# Patient Record
Sex: MALE | Race: OTHER | NOT HISPANIC OR LATINO | Employment: OTHER | URBAN - METROPOLITAN AREA
[De-identification: names, ages, dates, MRNs, and addresses within clinical notes are randomized per-mention and may not be internally consistent; named-entity substitution may affect disease eponyms.]

---

## 2019-11-14 LAB
A1C: 9 (ref 0–5.7)
CHOL/HDLC RATIO: 5.8 (ref 0–5)
CHOLEST SERPL-MSCNC: 208 MG/DL (ref 0–200)
HDLC SERPL-MCNC: 36 MG/DL (ref 35–70)
LDL/HDL RATIO: ABNORMAL
LDLC SERPL CALC-MCNC: ABNORMAL MG/DL
NON HDL CHOL (CALC): 172 (ref 0–130)
TRIGL SERPL-MCNC: 437 MG/DL (ref 40–160)

## 2019-11-21 ENCOUNTER — OFFICE VISIT (OUTPATIENT)
Dept: FAMILY MEDICINE | Facility: CLINIC | Age: 74
End: 2019-11-21

## 2019-11-21 ENCOUNTER — PATIENT OUTREACH (OUTPATIENT)
Dept: ADMINISTRATIVE | Facility: HOSPITAL | Age: 74
End: 2019-11-21

## 2019-11-21 VITALS
BODY MASS INDEX: 28.7 KG/M2 | TEMPERATURE: 99 F | HEART RATE: 93 BPM | DIASTOLIC BLOOD PRESSURE: 88 MMHG | WEIGHT: 178.56 LBS | HEIGHT: 66 IN | SYSTOLIC BLOOD PRESSURE: 138 MMHG | OXYGEN SATURATION: 98 %

## 2019-11-21 DIAGNOSIS — Z23 NEED FOR VACCINATION AGAINST STREPTOCOCCUS PNEUMONIAE: ICD-10-CM

## 2019-11-21 DIAGNOSIS — I10 ESSENTIAL HYPERTENSION: ICD-10-CM

## 2019-11-21 DIAGNOSIS — Z23 NEED FOR INFLUENZA VACCINATION: ICD-10-CM

## 2019-11-21 DIAGNOSIS — Z76.89 ENCOUNTER TO ESTABLISH CARE WITH NEW DOCTOR: ICD-10-CM

## 2019-11-21 DIAGNOSIS — E11.65 UNCONTROLLED TYPE 2 DIABETES MELLITUS WITH HYPERGLYCEMIA: ICD-10-CM

## 2019-11-21 DIAGNOSIS — I25.10 CORONARY ARTERY DISEASE INVOLVING NATIVE CORONARY ARTERY OF NATIVE HEART WITHOUT ANGINA PECTORIS: ICD-10-CM

## 2019-11-21 DIAGNOSIS — E11.65 TYPE 2 DIABETES MELLITUS WITH HYPERGLYCEMIA, WITHOUT LONG-TERM CURRENT USE OF INSULIN: ICD-10-CM

## 2019-11-21 DIAGNOSIS — Z00.00 VISIT FOR WELL MAN HEALTH CHECK: Primary | ICD-10-CM

## 2019-11-21 DIAGNOSIS — Z11.59 NEED FOR HEPATITIS C SCREENING TEST: ICD-10-CM

## 2019-11-21 DIAGNOSIS — E78.49 OTHER HYPERLIPIDEMIA: ICD-10-CM

## 2019-11-21 DIAGNOSIS — K21.9 GASTROESOPHAGEAL REFLUX DISEASE, ESOPHAGITIS PRESENCE NOT SPECIFIED: ICD-10-CM

## 2019-11-21 DIAGNOSIS — N18.30 CKD (CHRONIC KIDNEY DISEASE) STAGE 3, GFR 30-59 ML/MIN: ICD-10-CM

## 2019-11-21 DIAGNOSIS — Z12.11 COLON CANCER SCREENING: ICD-10-CM

## 2019-11-21 DIAGNOSIS — Z59.89 DOES NOT HAVE HEALTH INSURANCE: ICD-10-CM

## 2019-11-21 PROCEDURE — 99999 PR PBB SHADOW E&M-NEW PATIENT-LVL V: ICD-10-PCS | Mod: PBBFAC,,, | Performed by: FAMILY MEDICINE

## 2019-11-21 PROCEDURE — 99205 OFFICE O/P NEW HI 60 MIN: CPT | Mod: PBBFAC,PO,25 | Performed by: FAMILY MEDICINE

## 2019-11-21 PROCEDURE — 99999 PR PBB SHADOW E&M-NEW PATIENT-LVL V: CPT | Mod: PBBFAC,,, | Performed by: FAMILY MEDICINE

## 2019-11-21 PROCEDURE — 99387 PR PREVENTIVE VISIT,NEW,65 & OVER: ICD-10-PCS | Mod: S$PBB,,, | Performed by: FAMILY MEDICINE

## 2019-11-21 PROCEDURE — 99387 INIT PM E/M NEW PAT 65+ YRS: CPT | Mod: S$PBB,,, | Performed by: FAMILY MEDICINE

## 2019-11-21 PROCEDURE — 90662 IIV NO PRSV INCREASED AG IM: CPT | Mod: PBBFAC,PO

## 2019-11-21 PROCEDURE — 90472 IMMUNIZATION ADMIN EACH ADD: CPT | Mod: PBBFAC,PO

## 2019-11-21 RX ORDER — AMLODIPINE BESYLATE 10 MG/1
10 TABLET ORAL DAILY
Qty: 90 TABLET | Refills: 1 | Status: SHIPPED | OUTPATIENT
Start: 2019-11-21 | End: 2020-01-02 | Stop reason: SDUPTHER

## 2019-11-21 RX ORDER — BISOPROLOL FUMARATE 5 MG/1
5 TABLET, FILM COATED ORAL DAILY
Qty: 90 TABLET | Refills: 0 | Status: SHIPPED | OUTPATIENT
Start: 2019-11-21 | End: 2020-01-02 | Stop reason: SDUPTHER

## 2019-11-21 RX ORDER — GLIMEPIRIDE 4 MG/1
4 TABLET ORAL
Qty: 90 TABLET | Refills: 0 | Status: SHIPPED | OUTPATIENT
Start: 2019-11-21 | End: 2020-01-02

## 2019-11-21 RX ORDER — NAPROXEN SODIUM 220 MG/1
81 TABLET, FILM COATED ORAL DAILY
Start: 2019-11-21 | End: 2021-02-17 | Stop reason: SDUPTHER

## 2019-11-21 RX ORDER — METFORMIN HYDROCHLORIDE 1000 MG/1
1000 TABLET ORAL 2 TIMES DAILY WITH MEALS
Qty: 180 TABLET | Refills: 0 | Status: SHIPPED | OUTPATIENT
Start: 2019-11-21 | End: 2020-01-02 | Stop reason: SDUPTHER

## 2019-11-21 RX ORDER — VALSARTAN 160 MG/1
160 TABLET ORAL DAILY
Qty: 90 TABLET | Refills: 0 | Status: SHIPPED | OUTPATIENT
Start: 2019-11-21 | End: 2020-01-02 | Stop reason: SDUPTHER

## 2019-11-21 RX ORDER — OMEPRAZOLE 40 MG/1
40 CAPSULE, DELAYED RELEASE ORAL DAILY
Qty: 90 CAPSULE | Refills: 0 | Status: SHIPPED | OUTPATIENT
Start: 2019-11-21 | End: 2020-01-02 | Stop reason: SDUPTHER

## 2019-11-21 RX ORDER — AMLODIPINE BESYLATE 10 MG/1
10 TABLET ORAL DAILY
COMMUNITY
End: 2019-11-21 | Stop reason: SDUPTHER

## 2019-11-21 RX ORDER — OMEPRAZOLE 40 MG/1
40 CAPSULE, DELAYED RELEASE ORAL DAILY
COMMUNITY
End: 2019-11-21 | Stop reason: SDUPTHER

## 2019-11-21 RX ORDER — GLIMEPIRIDE 4 MG/1
4 TABLET ORAL 2 TIMES DAILY
Refills: 3 | COMMUNITY
Start: 2019-10-16 | End: 2019-11-21 | Stop reason: SDUPTHER

## 2019-11-21 RX ORDER — ATORVASTATIN CALCIUM 40 MG/1
80 TABLET, FILM COATED ORAL DAILY
Qty: 180 TABLET | Refills: 3 | Status: SHIPPED | OUTPATIENT
Start: 2019-11-21 | End: 2020-01-02 | Stop reason: SDUPTHER

## 2019-11-21 RX ORDER — METFORMIN HYDROCHLORIDE 1000 MG/1
1000 TABLET ORAL 2 TIMES DAILY
Refills: 3 | COMMUNITY
Start: 2019-10-16 | End: 2019-11-21 | Stop reason: SDUPTHER

## 2019-11-21 SDOH — SOCIAL DETERMINANTS OF HEALTH (SDOH): OTHER PROBLEMS RELATED TO HOUSING AND ECONOMIC CIRCUMSTANCES: Z59.89

## 2019-11-21 NOTE — PROGRESS NOTES
Subjective:       Patient ID: Joann Adrian is a 73 y.o. male.    Chief Complaint: Establish Care    Joann Adrian is a 73 y.o. male who presents today to [facility].     He has DM, HTN, and DLD along with CAD. He is not currently taking a statin.     Reviewed medication regimen. Made changes.     He had shingles, getting topamax for this from Dr. Eagle, pain management.     He is getting insurance in January.     No family history of thyroid cancer. No other history of endocrine tumor noted in the family that patient is aware of. The only family history of cancer that patient is aware of is: none    Diet/ Exercise: he can't eat much due to his teeth. He has bread and octavio in the day. He eats 3 meals/day. He walks about 5 blocks.     Labs: ordered and reviewed.     LDL unable to be calculated  Triglycerides >400  A1c: 9.0  CKD 3 noted, creatine 1.73. GFT: 39    C-scope: FIT Kit      PMHx: reviewed in EMR and updated  Meds: reviewed in EMR and updated  Shx: reviewed in EMR and updated  FMHx: no family history of colon cancer, breast cancer, ovarian cancer  Social: lives at home with his son (patient of mine), his wife, DIL, and 4 grandchildren. No pets at home.       Review of Systems   Constitutional: Positive for appetite change. Negative for chills and fever.   Respiratory: Negative for cough, chest tightness and shortness of breath.    Cardiovascular: Negative for chest pain and leg swelling.   Gastrointestinal: Negative for constipation, diarrhea, nausea and vomiting.   Genitourinary: Negative for difficulty urinating.   Neurological: Negative for dizziness, light-headedness and headaches.         Health Maintenance Due   Topic Date Due    Hepatitis C Screening  1945    Lipid Panel  1945    Hemoglobin A1c  1945    Fecal Occult Blood Test (FOBT)/FitKit  1945    Eye Exam  11/22/1956    TETANUS VACCINE  11/22/1964    Pneumococcal Vaccine (65+ High/Highest Risk) (1 of 2 -  PCV13) 11/22/2011     Immunization History   Administered Date(s) Administered    Influenza - High Dose - PF (65 years and older) 11/21/2019    Pneumococcal Polysaccharide - 23 Valent 11/21/2019         Objective:     Vitals:    11/21/19 1004   BP: 138/88   Pulse: 93   Temp: 98.6 °F (37 °C)        Physical Exam   Constitutional: He is oriented to person, place, and time. He appears well-developed and well-nourished.   HENT:   Head: Normocephalic and atraumatic.   Eyes: Conjunctivae are normal.   Neck: Normal range of motion. Neck supple.   Cardiovascular: Normal rate and regular rhythm.   Midline sternal scar noted   Pulmonary/Chest: Effort normal and breath sounds normal.   Abdominal: Soft. There is no tenderness.   Musculoskeletal: He exhibits no edema.   Feet:   Right Foot:   Protective Sensation: 10 sites tested. 10 sites sensed.   Skin Integrity: Positive for dry skin. Negative for callus.   Left Foot:   Protective Sensation: 10 sites tested. 10 sites sensed.   Skin Integrity: Positive for dry skin. Negative for callus.   Neurological: He is alert and oriented to person, place, and time.   Skin: Skin is warm. No rash noted.   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 His behavior is normal. Judgment and thought content normal.   Nursing note and vitals reviewed.      Assessment:       1. Visit for well man health check    2. Encounter to establish care with new doctor    3. Type 2 diabetes mellitus with hyperglycemia, without long-term current use of insulin    4. Uncontrolled type 2 diabetes mellitus with hyperglycemia    5. Essential hypertension    6. Other hyperlipidemia    7. Does not have health insurance    8. Coronary artery disease involving native coronary artery of native heart without angina pectoris    9. Gastroesophageal reflux disease, esophagitis presence not specified    10. BMI 28.0-28.9,adult    11. CKD (chronic kidney disease) stage 3,  GFR 30-59 ml/min    12. Need for influenza vaccination    13. Need for vaccination against Streptococcus pneumoniae    14. Need for hepatitis C screening test    15. Colon cancer screening        Plan:       Start atorvastatin 80 mg for cholesterol and CAD  Continue bisoprolol 5 mg for your heart/HTN  Continue amlodipine 10 mg for HTN  Start valsartan 160 mg for HTN  Continue metformin 1000 mg Twice daily  Continue amaryl but ONLY once daily  Start once weekly trulicity - monitor for any issues such as swallowing or neck swelling. Can get one month supply x 1 free from [facility]. Discussed case with pharmacist there.       Advised frequent self blood glucose monitoring.  Patient encouraged to document glucose results and bring them to every clinic visit. Will send me a log on patient portal in 2 weeks.  Hypoglycemia precautions discussed. Instructed on precautions before driving.    Close adherence to lifestyle changes recommended    No NSAIDS such as ibuprofen or naproxen. Avoid Red meats. Drink a lot of fluids. Continue monitoring CMP q3-6 months      60 minutes spent with patient, of which >50% was spent on counseling and coordination of care.       Visit for [facility] health check  -     Cancel: Diabetic Eye Screening Photo; Future  -     Microalbumin/creatinine urine ratio; Standing  -     Ambulatory referral to Outpatient Case Management  -     Ambulatory referral to Social Work  -     CBC auto differential; Future; Expected date: 11/21/2019  -     Comprehensive metabolic panel; Future; Expected date: 11/21/2019  -     Hemoglobin A1c; Future; Expected date: 11/21/2019  -     Lipid panel; Future; Expected date: 11/21/2019  -     Ambulatory Referral to Pharmacy Assistance  -     Ambulatory referral to Optometry    Encounter to establish care with new doctor  -     Ambulatory referral to Outpatient Case Management  -     Ambulatory referral to Social Work  -     Ambulatory Referral to Pharmacy  Assistance    Type 2 diabetes mellitus with hyperglycemia, without long-term current use of insulin  -     Cancel: Diabetic Eye Screening Photo; Future  -     Microalbumin/creatinine urine ratio; Standing  -     Ambulatory referral to Outpatient Case Management  -     Ambulatory referral to Social Work  -     Ambulatory Referral to Pharmacy Assistance  -     dulaglutide (TRULICITY) 1.5 mg/0.5 mL PnIj; Inject 1 Dose into the skin once a week.  Dispense: 2 mL; Refill: 0  -     metFORMIN (GLUCOPHAGE) 1000 MG tablet; Take 1 tablet (1,000 mg total) by mouth 2 (two) times daily with meals.  Dispense: 180 tablet; Refill: 0  -     glimepiride (AMARYL) 4 MG tablet; Take 1 tablet (4 mg total) by mouth daily with breakfast.  Dispense: 90 tablet; Refill: 0  -     Ambulatory Referral to Medical Fitness ([facility])  -     [facility] ASSIGN QUESTIONNAIRE SERIES ([facility])  -     [facility] ([facility])  -     Ambulatory referral to Optometry    Uncontrolled type 2 diabetes mellitus with hyperglycemia  -     Ambulatory referral to Outpatient Case Management  -     Ambulatory referral to Social Work  -     Hemoglobin A1c; Future; Expected date: 11/21/2019  -     Ambulatory Referral to Pharmacy Assistance  -     Ambulatory referral to Optometry    Essential hypertension  -     Ambulatory referral to Outpatient Case Management  -     Ambulatory referral to Social Work  -     CBC auto differential; Future; Expected date: 11/21/2019  -     Comprehensive metabolic panel; Future; Expected date: 11/21/2019  -     Ambulatory Referral to Pharmacy Assistance  -     bisoprolol (ZEBETA) 5 MG tablet; Take 1 tablet (5 mg total) by mouth once daily.  Dispense: 90 tablet; Refill: 0  -     amLODIPine (NORVASC) 10 MG tablet; Take 1 tablet (10 mg total) by mouth once daily.  Dispense: 90 tablet; Refill: 1  -     valsartan (DIOVAN) 160 MG tablet; Take 1 tablet (160 mg total) by mouth once daily.  Dispense: 90 tablet; Refill:  0    Other hyperlipidemia  -     Ambulatory referral to Outpatient Case Management  -     Ambulatory referral to Social Work  -     Lipid panel; Future; Expected date: 11/21/2019  -     Ambulatory Referral to Pharmacy Assistance  -     atorvastatin (LIPITOR) 80 MG tablet; Take 1 tablet (80 mg total) by mouth once daily.  Dispense: 90 tablet; Refill: 3    Does not have health insurance  -     Ambulatory referral to Outpatient Case Management  -     Ambulatory referral to Social Work  -     Ambulatory Referral to Pharmacy Assistance  -     Ambulatory Referral to Medical Fitness ([facility])  -     [facility] ASSIGN QUESTIONNAIRE SERIES ([facility])  -     [facility] Patient Entered [facility] ([facility])    Coronary artery disease involving native coronary artery of native heart without angina pectoris  -     bisoprolol (ZEBETA) 5 MG tablet; Take 1 tablet (5 mg total) by mouth once daily.  Dispense: 90 tablet; Refill: 0  -     aspirin 81 MG Chew; Take 1 tablet (81 mg total) by mouth once daily.  -     Echo Color Flow Doppler? Yes; Future  -     Ambulatory referral to Cardiology    Gastroesophageal reflux disease, esophagitis presence not specified  -     omeprazole (PRILOSEC) 40 MG capsule; Take 1 capsule (40 mg total) by mouth once daily.  Dispense: 90 capsule; Refill: 0    BMI 28.0-28.9,adult  -     Ambulatory Referral to Medical Fitness ([facility])  -     [facility] ASSIGN QUESTIONNAIRE SERIES ([facility])  -     [facility] ([facility])    CKD (chronic kidney disease) stage 3, GFR 30-59 ml/min  -     Vitamin D; Future; Expected date: 11/21/2019  -     Cancel: Urinalysis  -     Cancel: Urine culture  -     Urine culture; Future; Expected date: 11/21/2019  -     Urinalysis; Future; Expected date: 11/21/2019    Need for influenza vaccination  -     Influenza - High Dose (65+) (PF) (IM)    Need for vaccination against Streptococcus pneumoniae  -     (In Office Administered) Pneumococcal Polysaccharide  Vaccine (23 Valent) (SQ/IM)    Need for hepatitis C screening test  -     Hepatitis C antibody; Future; Expected date: 11/21/2019    Colon cancer screening  -     Fecal Immunochemical Test (iFOBT); Future; Expected date: 11/21/2019

## 2019-11-21 NOTE — Clinical Note
Please add on Hep C and urine to f/u labs in 3 months.Shanna, please schedule cardiology/ECHO after 1/1/20. If there is an external optometry that takes uninsured patients, please have him set up for that. If not available, schedule optometry after 1/1/20 within [facility].

## 2019-11-21 NOTE — PATIENT INSTRUCTIONS
Start atorvastatin 80 mg for cholesterol and CAD  Continue bisoprolol 5 mg for your heart/HTN  Continue amlodipine 10 mg for HTN  Start valsartan 160 mg for HTN  Continue metformin 1000 mg Twice daily  Continue amaryl but ONLY once daily  Start once weekly trulicity - monitor for any issues such as swallowing or neck swelling      Advised frequent self blood glucose monitoring.  Patient encouraged to document glucose results and bring them to every clinic visit. Will send me a log on patient portal in 2 weeks.  Hypoglycemia precautions discussed. Instructed on precautions before driving.    Close adherence to lifestyle changes recommended    No NSAIDS such as ibuprofen or naproxen. Avoid Red meats. Drink a lot of fluids. Continue monitoring CMP q3-6 months

## 2019-11-22 ENCOUNTER — LAB VISIT (OUTPATIENT)
Dept: LAB | Facility: HOSPITAL | Age: 74
End: 2019-11-22
Attending: FAMILY MEDICINE

## 2019-11-22 DIAGNOSIS — Z12.11 COLON CANCER SCREENING: ICD-10-CM

## 2019-11-22 PROCEDURE — 82274 ASSAY TEST FOR BLOOD FECAL: CPT

## 2019-11-23 ENCOUNTER — PATIENT MESSAGE (OUTPATIENT)
Dept: FAMILY MEDICINE | Facility: CLINIC | Age: 74
End: 2019-11-23

## 2019-11-23 ENCOUNTER — PATIENT MESSAGE (OUTPATIENT)
Dept: ADMINISTRATIVE | Facility: OTHER | Age: 74
End: 2019-11-23

## 2019-12-02 LAB — HEMOCCULT STL QL IA: NEGATIVE

## 2019-12-06 ENCOUNTER — OUTPATIENT CASE MANAGEMENT (OUTPATIENT)
Dept: ADMINISTRATIVE | Facility: OTHER | Age: 74
End: 2019-12-06

## 2019-12-06 DIAGNOSIS — E11.9 TYPE 2 DIABETES MELLITUS WITHOUT COMPLICATION, UNSPECIFIED WHETHER LONG TERM INSULIN USE: ICD-10-CM

## 2019-12-06 NOTE — PROGRESS NOTES
Outpatient Care Management   - Low Risk Patient Assessment    Patient: Joann Adrian  MRN:  [id number]  Date of Service:  12/6/2019  Completed by:  Leia Bergeron LCSW  Referral Date: 11/21/2019  Program: OPCM Low Risk    Reason for Visit   Patient presents with    OPCM Chart Review    Social Work Assessment - Low/Mod Risk    Plan Of Care    Case Closure       Patient Summary     OPCM Social Work Assessment (Low/Moderate Risk)    General  Level of Caregiver support:  Member independent and does not need caregiver assistance  Have you had to make a decision between paying for any of the following in the last 2 months?:  None  Transportation means:  Family  Employment status:  Retired and not working  Do you have any of the following?:  Caregiver make informed decisions on your behalf  Current symptoms:  Restlessness  Assessments  Was the PHQ Depression Screening completed this visit?:  No  Was the RAVINDER-7 Screening completed this visit?:  No         Complex Care Plan     Care plan was discussed and completed today with input from patient and/or caregiver.    Goals      Patient/caregiver will have knowledge of resources available in order to obtain the services that are needed prior to the end of this encounter.      Overall Time to Completion  1 week from 12/06/2019     Social Work Identified Patient Barriers:         Short Term Goals  Patient/caregiver will verbalize knowledge of available resources prior to the end of this encounter.   Interventions:  · Complete community search for available resources via [facility].  · Discuss and provide community resources by [employer] Mail, telephonically, and referral made to PAP: Dental resources ([facility] [employer] [facility], [facility]), contact number for [facility], Advance Directives  Status:  · Met              Patient Instructions     No follow-ups on file.    Todays OPCM Self-Management Care  Plan was developed with the patients/caregivers input and was based on identified barriers from todays assessment.  Goals were written today with the patient/caregiver and the patient has agreed to work towards these goals to improve his/her overall well-being. Patient verbalized understanding of the care plan, goals, and all of today's instructions. Encouraged patient/caregiver to communicate with his/her physician and health care team about health conditions and the treatment plan.  Provided my contact information today and encouraged patient/caregiver to call me with any questions as needed.

## 2019-12-06 NOTE — PROGRESS NOTES
This LCSW received a referral on the above patient.    Reason for referral: consultation, no insurance  Pt gave permission to complete assessment with his son.  Per Jory with [facility], pt became a legal [place] citizen 4 months ago; per Medicaid eligibility requirements: must be a legal resident for 5 years.  Per conversation with son, pt did not pay into the Medicare system therefore may not be eligible for Medicare.  Son has contact number for the social security office.  Son states pt is signed up with [facility] effective Jan 1, 2020.  Encouraged son to have insurance card scanned into chart at next MD appointment  Name of community resource that was provided: Inbox message to PAP, [facility] [phone], dental resources ([facility], [facility]), Advance Directives.    Resource given to: son

## 2019-12-21 DIAGNOSIS — E11.65 TYPE 2 DIABETES MELLITUS WITH HYPERGLYCEMIA, WITHOUT LONG-TERM CURRENT USE OF INSULIN: ICD-10-CM

## 2019-12-31 ENCOUNTER — DOCUMENTATION ONLY (OUTPATIENT)
Dept: PHARMACY | Facility: CLINIC | Age: 74
End: 2019-12-31

## 2019-12-31 NOTE — PROGRESS NOTES
Patient Picked up Amlodipine, Atorvastatin, Bisoprolol, Glimepride,Metformin, Omperazole, Valsartan free of charge. He is enrolled in our [employer] Pricing Program through 02/21/20.  Any additional medication fills must be approved by PAP Technician via cost transfer form. Restrictions apply.

## 2020-01-02 ENCOUNTER — OFFICE VISIT (OUTPATIENT)
Dept: FAMILY MEDICINE | Facility: CLINIC | Age: 75
End: 2020-01-02
Payer: COMMERCIAL

## 2020-01-02 ENCOUNTER — TELEPHONE (OUTPATIENT)
Dept: PHARMACY | Facility: CLINIC | Age: 75
End: 2020-01-02

## 2020-01-02 VITALS
TEMPERATURE: 98 F | HEART RATE: 96 BPM | DIASTOLIC BLOOD PRESSURE: 78 MMHG | HEIGHT: 66 IN | BODY MASS INDEX: 28.91 KG/M2 | SYSTOLIC BLOOD PRESSURE: 130 MMHG | WEIGHT: 179.88 LBS | OXYGEN SATURATION: 96 %

## 2020-01-02 DIAGNOSIS — R10.10 PAIN OF UPPER ABDOMEN: ICD-10-CM

## 2020-01-02 DIAGNOSIS — I10 ESSENTIAL HYPERTENSION: ICD-10-CM

## 2020-01-02 DIAGNOSIS — E11.65 TYPE 2 DIABETES MELLITUS WITH HYPERGLYCEMIA, WITHOUT LONG-TERM CURRENT USE OF INSULIN: Primary | ICD-10-CM

## 2020-01-02 DIAGNOSIS — R35.0 URINARY FREQUENCY: ICD-10-CM

## 2020-01-02 DIAGNOSIS — N18.30 CKD (CHRONIC KIDNEY DISEASE) STAGE 3, GFR 30-59 ML/MIN: ICD-10-CM

## 2020-01-02 DIAGNOSIS — E11.65 UNCONTROLLED TYPE 2 DIABETES MELLITUS WITH HYPERGLYCEMIA: ICD-10-CM

## 2020-01-02 DIAGNOSIS — K21.9 GASTROESOPHAGEAL REFLUX DISEASE, ESOPHAGITIS PRESENCE NOT SPECIFIED: ICD-10-CM

## 2020-01-02 DIAGNOSIS — E78.49 OTHER HYPERLIPIDEMIA: ICD-10-CM

## 2020-01-02 DIAGNOSIS — I25.10 CORONARY ARTERY DISEASE INVOLVING NATIVE CORONARY ARTERY OF NATIVE HEART WITHOUT ANGINA PECTORIS: ICD-10-CM

## 2020-01-02 DIAGNOSIS — K59.09 CHRONIC CONSTIPATION: ICD-10-CM

## 2020-01-02 DIAGNOSIS — M54.31 SCIATICA OF RIGHT SIDE: ICD-10-CM

## 2020-01-02 LAB
BACTERIA #/AREA URNS AUTO: ABNORMAL /HPF
BILIRUB UR QL STRIP: NEGATIVE
CLARITY UR REFRACT.AUTO: CLEAR
COLOR UR AUTO: YELLOW
GLUCOSE UR QL STRIP: NEGATIVE
HGB UR QL STRIP: ABNORMAL
HYALINE CASTS UR QL AUTO: 3 /LPF
KETONES UR QL STRIP: NEGATIVE
LEUKOCYTE ESTERASE UR QL STRIP: NEGATIVE
MICROSCOPIC COMMENT: ABNORMAL
NITRITE UR QL STRIP: NEGATIVE
PH UR STRIP: 5 [PH] (ref 5–8)
PROT UR QL STRIP: ABNORMAL
RBC #/AREA URNS AUTO: 9 /HPF (ref 0–4)
SP GR UR STRIP: 1.02 (ref 1–1.03)
SQUAMOUS #/AREA URNS AUTO: 0 /HPF
URN SPEC COLLECT METH UR: ABNORMAL
WBC #/AREA URNS AUTO: 0 /HPF (ref 0–5)

## 2020-01-02 PROCEDURE — 99214 PR OFFICE/OUTPT VISIT, EST, LEVL IV, 30-39 MIN: ICD-10-PCS | Mod: S$GLB,,, | Performed by: FAMILY MEDICINE

## 2020-01-02 PROCEDURE — 99999 PR PBB SHADOW E&M-EST. PATIENT-LVL IV: CPT | Mod: PBBFAC,,, | Performed by: FAMILY MEDICINE

## 2020-01-02 PROCEDURE — 3075F PR MOST RECENT SYSTOLIC BLOOD PRESS GE 130-139MM HG: ICD-10-PCS | Mod: CPTII,S$GLB,, | Performed by: FAMILY MEDICINE

## 2020-01-02 PROCEDURE — 1159F PR MEDICATION LIST DOCUMENTED IN MEDICAL RECORD: ICD-10-PCS | Mod: S$GLB,,, | Performed by: FAMILY MEDICINE

## 2020-01-02 PROCEDURE — 87088 URINE BACTERIA CULTURE: CPT

## 2020-01-02 PROCEDURE — 87077 CULTURE AEROBIC IDENTIFY: CPT

## 2020-01-02 PROCEDURE — 1126F PR PAIN SEVERITY QUANTIFIED, NO PAIN PRESENT: ICD-10-PCS | Mod: S$GLB,,, | Performed by: FAMILY MEDICINE

## 2020-01-02 PROCEDURE — 3078F DIAST BP <80 MM HG: CPT | Mod: CPTII,S$GLB,, | Performed by: FAMILY MEDICINE

## 2020-01-02 PROCEDURE — 1101F PR PT FALLS ASSESS DOC 0-1 FALLS W/OUT INJ PAST YR: ICD-10-PCS | Mod: CPTII,S$GLB,, | Performed by: FAMILY MEDICINE

## 2020-01-02 PROCEDURE — 1126F AMNT PAIN NOTED NONE PRSNT: CPT | Mod: S$GLB,,, | Performed by: FAMILY MEDICINE

## 2020-01-02 PROCEDURE — 87086 URINE CULTURE/COLONY COUNT: CPT

## 2020-01-02 PROCEDURE — 3078F PR MOST RECENT DIASTOLIC BLOOD PRESSURE < 80 MM HG: ICD-10-PCS | Mod: CPTII,S$GLB,, | Performed by: FAMILY MEDICINE

## 2020-01-02 PROCEDURE — 81001 URINALYSIS AUTO W/SCOPE: CPT

## 2020-01-02 PROCEDURE — 1101F PT FALLS ASSESS-DOCD LE1/YR: CPT | Mod: CPTII,S$GLB,, | Performed by: FAMILY MEDICINE

## 2020-01-02 PROCEDURE — 99999 PR PBB SHADOW E&M-EST. PATIENT-LVL IV: ICD-10-PCS | Mod: PBBFAC,,, | Performed by: FAMILY MEDICINE

## 2020-01-02 PROCEDURE — 99214 OFFICE O/P EST MOD 30 MIN: CPT | Mod: S$GLB,,, | Performed by: FAMILY MEDICINE

## 2020-01-02 PROCEDURE — 87186 SC STD MICRODIL/AGAR DIL: CPT

## 2020-01-02 PROCEDURE — 1159F MED LIST DOCD IN RCRD: CPT | Mod: S$GLB,,, | Performed by: FAMILY MEDICINE

## 2020-01-02 PROCEDURE — 3075F SYST BP GE 130 - 139MM HG: CPT | Mod: CPTII,S$GLB,, | Performed by: FAMILY MEDICINE

## 2020-01-02 RX ORDER — BISOPROLOL FUMARATE 5 MG/1
5 TABLET, FILM COATED ORAL DAILY
Qty: 90 TABLET | Refills: 0 | Status: SHIPPED | OUTPATIENT
Start: 2020-01-02 | End: 2020-05-20 | Stop reason: SDUPTHER

## 2020-01-02 RX ORDER — VALSARTAN 160 MG/1
160 TABLET ORAL DAILY
Qty: 90 TABLET | Refills: 0 | Status: SHIPPED | OUTPATIENT
Start: 2020-01-02 | End: 2020-06-16 | Stop reason: SDUPTHER

## 2020-01-02 RX ORDER — OMEPRAZOLE 40 MG/1
40 CAPSULE, DELAYED RELEASE ORAL DAILY
Qty: 90 CAPSULE | Refills: 0 | Status: SHIPPED | OUTPATIENT
Start: 2020-01-02 | End: 2020-05-20 | Stop reason: SDUPTHER

## 2020-01-02 RX ORDER — GLIMEPIRIDE 1 MG/1
1 TABLET ORAL
Qty: 90 TABLET | Refills: 0 | Status: SHIPPED | OUTPATIENT
Start: 2020-01-02 | End: 2020-01-22

## 2020-01-02 RX ORDER — AMLODIPINE BESYLATE 10 MG/1
10 TABLET ORAL DAILY
Qty: 90 TABLET | Refills: 1 | Status: SHIPPED | OUTPATIENT
Start: 2020-01-02 | End: 2020-05-20 | Stop reason: SDUPTHER

## 2020-01-02 RX ORDER — GABAPENTIN 300 MG/1
300 CAPSULE ORAL NIGHTLY
Qty: 90 CAPSULE | Refills: 0 | Status: SHIPPED | OUTPATIENT
Start: 2020-01-02 | End: 2020-03-10

## 2020-01-02 RX ORDER — ATORVASTATIN CALCIUM 40 MG/1
40 TABLET, FILM COATED ORAL DAILY
Qty: 90 TABLET | Refills: 3 | Status: SHIPPED | OUTPATIENT
Start: 2020-01-02 | End: 2020-05-20 | Stop reason: SDUPTHER

## 2020-01-02 RX ORDER — METFORMIN HYDROCHLORIDE 1000 MG/1
1000 TABLET ORAL 2 TIMES DAILY WITH MEALS
Qty: 180 TABLET | Refills: 0 | Status: SHIPPED | OUTPATIENT
Start: 2020-01-02 | End: 2020-05-20 | Stop reason: SDUPTHER

## 2020-01-02 NOTE — PROGRESS NOTES
Subjective:       Patient ID: Joann Adrian is a 74 y.o. male.    Chief Complaint: Follow-up (6 wk dm)    LOW is a 74 y.o. male who presents today for follow up on his DM, HTN, DLD, and CAD    DM: on trulicity and metformin. amaryl 4 mg caused low blood sugar. Appetite is not good. He has gained weight.   GI: Has constipation. FIT test was negative. No pain with eating. This has been ongoing for months. He has intermittent abdominal pain. No triggers. .   DLD: Started atorvastatin 80 mg for cholesterol and CAD. 80 mg possible caused GI issues. Decreased to once weekly. This improved GI symptoms.    HTN: on bisoprolol 5 mg, amlodipine 10 mg, valsartan 160 mg for HTN  CAD: not taking statin consistently.     He has been having pain from his hip down to his right foot. This has been ongoing for about a year.     Review of Systems   Constitutional: Positive for appetite change. Negative for chills and fever.   Respiratory: Negative for cough, chest tightness and shortness of breath.    Cardiovascular: Negative for chest pain and leg swelling.   Gastrointestinal: Negative for constipation, diarrhea, nausea and vomiting.   Genitourinary: Negative for difficulty urinating.   Neurological: Negative for dizziness, light-headedness and headaches.         Results for orders placed or performed in visit on 11/22/19   Fecal Immunochemical Test (iFOBT)   Result Value Ref Range    Fecal Immunochemical Test (iFOBT) Negative Negative       Objective:     Vitals:    01/02/20 1039   BP: 130/78   Pulse: 96   Temp: 98.2 °F (36.8 °C)        Physical Exam   Constitutional: He is oriented to person, place, and time. He appears well-developed and well-nourished.   HENT:   Head: Normocephalic and atraumatic.   Eyes: Conjunctivae are normal.   Neck: Normal range of motion. Neck supple.   Cardiovascular: Normal rate and regular rhythm.   Midline sternal scar noted   Pulmonary/Chest: Effort normal and breath sounds normal.   Abdominal: Soft.  There is tenderness in the right upper quadrant.   Musculoskeletal: He exhibits tenderness. He exhibits no edema.   TTP of the piriformis (right)   Feet:   Left Foot:   Skin Integrity: Negative for callus.   Neurological: He is alert and oriented to person, place, and time.   Skin: Skin is warm.   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 His behavior is normal. Judgment and thought content normal.   Nursing note and vitals reviewed.      Assessment:       1. Type 2 diabetes mellitus with hyperglycemia, without long-term current use of insulin    2. Uncontrolled type 2 diabetes mellitus with hyperglycemia    3. CKD (chronic kidney disease) stage 3, GFR 30-59 ml/min    4. Essential hypertension    5. Other hyperlipidemia    6. BMI 29.0-29.9,adult    7. Gastroesophageal reflux disease, esophagitis presence not specified    8. Coronary artery disease involving native coronary artery of native heart without angina pectoris    9. Sciatica of right side    10. Chronic constipation    11. Pain of upper abdomen    12. Urinary frequency        Plan:       continue atorvastatin but at 40 mg for cholesterol and CAD  Continue bisoprolol 5 mg for your heart/HTN  Continue amlodipine 10 mg for HTN  Continue valsartan 160 mg for HTN  Continue metformin 1000 mg Twice daily  Restart amaryl but only at 1 MG daily  Continue once weekly trulicity - monitor for any issues such as swallowing or neck swelling. Can get one month supply x 1 free from [facility]. Discussed case with pharmacist there.     Start gabapentin and PT for suspected sciatic pain. No imaging at this time.     Having urinary frequency; check urine today. 2/2 to DM vs BPH? Will try to ensure diabetes is well controlled. F/u at next visit.      Advised frequent self blood glucose monitoring.  Patient encouraged to document glucose results and bring them to every clinic visit. Will send me a log on patient portal  in 2 weeks. Reviewed BS log today:   Fasting sugars between 110-170.     Close adherence to lifestyle changes recommended     No NSAIDS such as ibuprofen or naproxen. Avoid Red meats. Drink a lot of fluids. Continue monitoring CMP q3-6 months    Labs in 6 weeks. F/u after.     Type 2 diabetes mellitus with hyperglycemia, without long-term current use of insulin  -     dulaglutide (TRULICITY) 1.5 mg/0.5 mL PnIj; Inject 1 syringe into the skin once a week.  Dispense: 6 mL; Refill: 0  -     metFORMIN (GLUCOPHAGE) 1000 MG tablet; Take 1 tablet (1,000 mg total) by mouth 2 (two) times daily with meals.  Dispense: 180 tablet; Refill: 0  -     glimepiride (AMARYL) 1 MG tablet; Take 1 tablet (1 mg total) by mouth before breakfast.  Dispense: 90 tablet; Refill: 0    Uncontrolled type 2 diabetes mellitus with hyperglycemia  -     dulaglutide (TRULICITY) 1.5 mg/0.5 mL PnIj; Inject 1 syringe into the skin once a week.  Dispense: 6 mL; Refill: 0  -     metFORMIN (GLUCOPHAGE) 1000 MG tablet; Take 1 tablet (1,000 mg total) by mouth 2 (two) times daily with meals.  Dispense: 180 tablet; Refill: 0  -     glimepiride (AMARYL) 1 MG tablet; Take 1 tablet (1 mg total) by mouth before breakfast.  Dispense: 90 tablet; Refill: 0    CKD (chronic kidney disease) stage 3, GFR 30-59 ml/min    Essential hypertension  -     valsartan (DIOVAN) 160 MG tablet; Take 1 tablet (160 mg total) by mouth once daily.  Dispense: 90 tablet; Refill: 0  -     bisoprolol (ZEBETA) 5 MG tablet; Take 1 tablet (5 mg total) by mouth once daily.  Dispense: 90 tablet; Refill: 0  -     amLODIPine (NORVASC) 10 MG tablet; Take 1 tablet (10 mg total) by mouth once daily.  Dispense: 90 tablet; Refill: 1    Other hyperlipidemia  -     atorvastatin (LIPITOR) 40 MG tablet; Take 1 tablet (40 mg total) by mouth once daily.  Dispense: 90 tablet; Refill: 3    BMI 29.0-29.9,adult    Gastroesophageal reflux disease, esophagitis presence not specified  -     omeprazole (PRILOSEC) 40  MG capsule; Take 1 capsule (40 mg total) by mouth once daily.  Dispense: 90 capsule; Refill: 0    Coronary artery disease involving native coronary artery of native heart without angina pectoris  -     bisoprolol (ZEBETA) 5 MG tablet; Take 1 tablet (5 mg total) by mouth once daily.  Dispense: 90 tablet; Refill: 0    Sciatica of right side  -     gabapentin (NEURONTIN) 300 MG capsule; Take 1 capsule (300 mg total) by mouth every evening.  Dispense: 90 capsule; Refill: 0  -     Ambulatory Referral to Physical/Occupational Therapy    Chronic constipation  -     CT Abdomen Pelvis With Contrast; Future; Expected date: 01/02/2020  -     Creatinine, serum; Future; Expected date: 01/02/2020    Pain of upper abdomen  -     CT Abdomen Pelvis With Contrast; Future; Expected date: 01/02/2020  -     Creatinine, serum; Future; Expected date: 01/02/2020    Urinary frequency  -     Urinalysis  -     Urine culture        Warning signs discussed, patient to call with any further issues or worsening of symptoms.

## 2020-01-02 NOTE — PATIENT INSTRUCTIONS
continue atorvastatin but at 40 mg for cholesterol and CAD  Continue bisoprolol 5 mg for your heart/HTN  Continue amlodipine 10 mg for HTN  Start valsartan 160 mg for HTN  Continue metformin 1000 mg Twice daily  Continue amaryl but only at 1 MG daily  Continue once weekly trulicity - monitor for any issues such as swallowing or neck swelling. Can get one month supply x 1 free from [facility]. Discussed case with pharmacist there.     Start gabapentin nightly and PT for sciatic pain.      Advised frequent self blood glucose monitoring.  Patient encouraged to document glucose results and bring them to every clinic visit. Will send me a log on patient portal in 2 weeks.  Close adherence to lifestyle changes recommended     No NSAIDS such as ibuprofen or naproxen. Avoid Red meats. Drink a lot of fluids. Continue monitoring CMP q3-6 months

## 2020-01-05 ENCOUNTER — PATIENT MESSAGE (OUTPATIENT)
Dept: FAMILY MEDICINE | Facility: CLINIC | Age: 75
End: 2020-01-05

## 2020-01-05 DIAGNOSIS — N39.0 ENTEROCOCCUS UTI: Primary | ICD-10-CM

## 2020-01-05 DIAGNOSIS — B95.2 ENTEROCOCCUS UTI: Primary | ICD-10-CM

## 2020-01-05 LAB — BACTERIA UR CULT: ABNORMAL

## 2020-01-05 RX ORDER — AMOXICILLIN 500 MG/1
500 TABLET, FILM COATED ORAL 3 TIMES DAILY
Qty: 15 TABLET | Refills: 0 | Status: SHIPPED | OUTPATIENT
Start: 2020-01-05 | End: 2020-01-10

## 2020-01-08 ENCOUNTER — LAB VISIT (OUTPATIENT)
Dept: LAB | Facility: HOSPITAL | Age: 75
End: 2020-01-08
Attending: FAMILY MEDICINE
Payer: COMMERCIAL

## 2020-01-08 DIAGNOSIS — K59.09 CHRONIC CONSTIPATION: ICD-10-CM

## 2020-01-08 DIAGNOSIS — R10.10 PAIN OF UPPER ABDOMEN: ICD-10-CM

## 2020-01-08 LAB
CREAT SERPL-MCNC: 1.6 MG/DL (ref 0.5–1.4)
EST. GFR  (AFRICAN AMERICAN): 49 ML/MIN/1.73 M^2
EST. GFR  (NON AFRICAN AMERICAN): 42 ML/MIN/1.73 M^2

## 2020-01-08 PROCEDURE — 82565 ASSAY OF CREATININE: CPT

## 2020-01-08 PROCEDURE — 36415 COLL VENOUS BLD VENIPUNCTURE: CPT

## 2020-01-09 ENCOUNTER — HOSPITAL ENCOUNTER (OUTPATIENT)
Dept: RADIOLOGY | Facility: HOSPITAL | Age: 75
Discharge: HOME | End: 2020-01-09
Attending: FAMILY MEDICINE
Payer: COMMERCIAL

## 2020-01-09 DIAGNOSIS — R10.10 PAIN OF UPPER ABDOMEN: ICD-10-CM

## 2020-01-09 DIAGNOSIS — K80.20 CALCULUS OF GALLBLADDER WITHOUT CHOLECYSTITIS WITHOUT OBSTRUCTION: ICD-10-CM

## 2020-01-09 DIAGNOSIS — R91.1 INCIDENTAL LUNG NODULE, GREATER THAN OR EQUAL TO 8MM: Primary | ICD-10-CM

## 2020-01-09 DIAGNOSIS — R59.9 ENLARGED LYMPH NODES: ICD-10-CM

## 2020-01-09 DIAGNOSIS — K59.09 CHRONIC CONSTIPATION: ICD-10-CM

## 2020-01-09 PROCEDURE — 74177 CT ABD & PELVIS W/CONTRAST: CPT | Mod: TC

## 2020-01-09 PROCEDURE — 74177 CT ABDOMEN PELVIS WITH CONTRAST: ICD-10-PCS | Mod: 26,,, | Performed by: RADIOLOGY

## 2020-01-09 PROCEDURE — 74177 CT ABD & PELVIS W/CONTRAST: CPT | Mod: 26,,, | Performed by: RADIOLOGY

## 2020-01-09 PROCEDURE — 25500020 PHARM REV CODE 255: Performed by: FAMILY MEDICINE

## 2020-01-09 RX ADMIN — IOHEXOL 1000 ML: 9 SOLUTION ORAL at 11:01

## 2020-01-09 RX ADMIN — IOHEXOL 75 ML: 350 INJECTION, SOLUTION INTRAVENOUS at 12:01

## 2020-01-15 ENCOUNTER — CLINICAL SUPPORT (OUTPATIENT)
Dept: REHABILITATION | Facility: HOSPITAL | Age: 75
End: 2020-01-15
Payer: COMMERCIAL

## 2020-01-15 DIAGNOSIS — M54.41 CHRONIC RIGHT-SIDED LOW BACK PAIN WITH RIGHT-SIDED SCIATICA: ICD-10-CM

## 2020-01-15 DIAGNOSIS — M25.551 PAIN OF RIGHT HIP JOINT: ICD-10-CM

## 2020-01-15 DIAGNOSIS — G89.29 CHRONIC RIGHT-SIDED LOW BACK PAIN WITH RIGHT-SIDED SCIATICA: ICD-10-CM

## 2020-01-15 PROCEDURE — 97110 THERAPEUTIC EXERCISES: CPT | Mod: PN | Performed by: PHYSICAL THERAPIST

## 2020-01-15 PROCEDURE — 97162 PT EVAL MOD COMPLEX 30 MIN: CPT | Mod: PN | Performed by: PHYSICAL THERAPIST

## 2020-01-15 NOTE — PATIENT INSTRUCTIONS
Strengthening: Hip Abductor - Resisted    Lie flat with band looped around both legs above knees, and push thighs apart, ensuring right and left move together.  Repeat 10 times per set. Do 3 sets per session. Do 1 sessions per day.      Clam Shells    Lie on side with knees bent. Raise top leg, keeping knee bent and ankles together. Do not let your hips roll back as your raise your leg.  Repeat 12 times each leg per set. Do 2 sets per session. Do 1 sessions per day.       Piriformis Stretch - Supine        Pull uninvolved knee across body toward opposite shoulder. Hold slight stretch for 20 seconds. Repeat with involved leg.  Repeat 4 times. Do 1-2 times per day.    Copyright © I. All rights reserved.

## 2020-01-15 NOTE — PLAN OF CARE
[facility]  Physical Therapy Initial Evaluation    Name: Joann Adrian  Clinic Number: [id number]    Therapy Diagnosis:   Encounter Diagnoses   Name Primary?    Chronic right-sided low back pain with right-sided sciatica     Pain of right hip joint      Physician: Karel Matthew DO    Physician Orders: PT Eval and Treat   Medical Diagnosis from Referral: M54.31 (ICD-10-CM) - Sciatica of right side  Evaluation Date: 1/15/2020  Authorization Period Expiration: 12/31/2020  Plan of Care Expiration: 3/11/2020  Visit # / Visits authorized: 1/ 50  FOTO: 1/10      Time In: 1225  Time Out: 100  Total Billable Time: 35 minutes    Precautions: Standard, Diabetes and CAD    Subjective   Date of onset: 6-7 months   *Patient's son translating during appointment.     History of current condition - KD reports: posterior and lateral hip pain that has progressively gotten worse over previous few months, that radiates down right lower extremity into ankle/foot. Pt presents pain at all times, just fluctuates with activity. Pt is able to still walk but has antalgic gait along RLE due to pain and weakness. Pt does not have low back pain as much, mainly starts in hip prior to radiation.      Medical History:   Past Medical History:   Diagnosis Date    Coronary artery disease     Diabetes mellitus, type 2     Hyperlipidemia     Hypertension        Surgical History:   Joann Adrian  has a past surgical history that includes Coronary artery bypass graft and Cataract extraction, bilateral.    Medications:   Joann has a current medication list which includes the following prescription(s): amlodipine, aspirin, atorvastatin, bisoprolol, dulaglutide, gabapentin, glimepiride, OPW TEST CLAIM - DO NOT FILL, metformin, omeprazole, and valsartan.    Allergies:   Review of patient's allergies indicates:  No Known Allergies     Imaging, None    Prior Therapy: None  Social History:  lives with their  family  Occupation: Retired  Prior Level of Function: able to complete all ADL's without discomfort  Current Level of Function: unable to complete ADL's as well as transfers without radiating pains.     Pain:  Current 4/10, worst 9/10, best 3/10   Location: right hip, right lower extremity   Description: Aching, Dull, Burning, Tingling, Numb, Sharp and Shooting  Aggravating Factors: Standing, Walking, Lifting and Getting out of bed/chair  Easing Factors: nothing    Pts goals: to decrease radiating pain, improve walking and transitions    Objective     Posture: No significant deficits noted  Palpation: Minimal tenderness noted along right lateral hip, posterior hip  Sensation: No deficits noted on evaluation    Range of Motion/Strength:     Thoracolumbar AROM Pain/Dysfunction with Movement   Flexion 70    Extension 15 Discomfort in back   Right side bending 20    Left side bending 20        Hip Right Left Pain/Dysfunction with Movement   AROM/PROM      flexion WNL WNL    extension Limited Limited    abduction WNL WNL    Internal rotation Limited Limited Painful in hip   External rotation WNL WNL Stiffness in end range bilaterally     Knee Right Left Pain/Dysfunction with Movement   AROM/PROM WNL WNL        L/E MMT Right Left Pain/Dysfunction with Movement   Hip Flexion 4/5 4/5    Hip Extension 4+/5 4+/5    Hip Abduction 4/5 4/5    Hip Adduction 5/5 5/5    Knee Flexion 5/5 5/5    Knee Extension 4+/5 4+/5    Ankle DF 5/5 5/5    Ankle PF 5/5 5/5        Special Tests: SLR (-) bilaterally. Hamstring tightness noted. Piriformis test (+) on right side.     Gait Analysis: Antalgic gait noted, minimal trendelenberg along right side      CMS Impairment/Limitation/Restriction for FOTO Lumbar spine Survey    Therapist reviewed FOTO scores for Joann Adrian on 1/15/2020.   FOTO documents entered into [employer] - see Media section.    Limitation Score: 52%         TREATMENT   Treatment Time In: 1245  Treatment Time Out: 100  Total  "Treatment time separate from Evaluation: 15 minutes    KD received therapeutic exercises to develop strength and flexibility for 15 minutes including:  Supine hip abduction with strap 5" hold, 30x  Clamshells 2x12 bilateral  Piriformis stretch 20" hold, 4x bilateral  HEP demonstration    Home Exercises Provided and Patient Education Provided     Education provided:   - HEP provided  - Home modalities prn    Written Home Exercises Provided: yes.  Exercises were reviewed and KD was able to demonstrate them prior to the end of the session.  KD demonstrated good  understanding of the education provided.     See EMR under Patient Instructions for exercises provided 1/15/2020.      Assessment   Joann is a 74 y.o. male referred to outpatient Physical Therapy with a medical diagnosis of sciatica of right side. Pt presents with radiating pains down RLE during activity and at rest. Pt educated on home modalities as well as given HEP to further improve pain. Pt will benefit from hip soft tissue management and strengthening program.    Pt prognosis is Good.   Pt will benefit from skilled outpatient Physical Therapy to address the deficits stated above and in the chart below, provide pt/family education, and to maximize pt's level of independence.     Plan of care discussed with patient: Yes  Pt's spiritual, cultural and educational needs considered and patient is agreeable to the plan of care and goals as stated below:     Anticipated Barriers for therapy: Language barrier, age    Medical Necessity is demonstrated by the following  History  Co-morbidities and personal factors that may impact the plan of care Co-morbidities:   advanced age and diabetes    Personal Factors:   age  social background     high   Examination  Body Structures and Functions, activity limitations and participation restrictions that may impact the plan of care Body Regions:   back    Body Systems:    gross symmetry  ROM  strength  gross coordinated " movement  gait  transfers    Participation Restrictions:   Walking  lifting    Activity limitations:   Learning and applying knowledge  no deficits    General Tasks and Commands  undertaking a single task    Communication  no deficits    Mobility  lifting and carrying objects  walking    Self care  no deficits    Domestic Life  doing house work (cleaning house, washing dishes, laundry)    Interactions/Relationships  no deficits    Life Areas  no deficits    Community and Social Life  community life  recreation and leisure         high   Clinical Presentation evolving clinical presentation with changing clinical characteristics moderate   Decision Making/ Complexity Score: moderate       Goals:  Long Term Goals (8 Weeks):   1. Pt will be compliant with HEP to supplement PT in decreasing pain with functional mobility.  2. Pt will improve impaired LE MMTs to >/=5/5 to improve strength for functional tasks.  3. Pt will improve FOTO score to </= 40% limited to decrease perceived limitation with maintaining/changing body position.   4. Pt will report no pain during lumbar ROM to promote functional mobility.      Plan   Plan of care Certification: 1/15/2020 to 3/11/2020.    Outpatient Physical Therapy 1 times weekly for 8 weeks to include the following interventions: Gait Training, Manual Therapy, Moist Heat/ Ice, Neuromuscular Re-ed, Patient Education, Self Care, Therapeutic Activites and Therapeutic Exercise, ASTYM, Kinesiotaping PRN, Functional Dry Needling    Carlos Dougherty, PT, DPT

## 2020-01-22 ENCOUNTER — DOCUMENTATION ONLY (OUTPATIENT)
Dept: REHABILITATION | Facility: HOSPITAL | Age: 75
End: 2020-01-22

## 2020-01-22 ENCOUNTER — TELEPHONE (OUTPATIENT)
Dept: FAMILY MEDICINE | Facility: CLINIC | Age: 75
End: 2020-01-22

## 2020-01-22 ENCOUNTER — PATIENT MESSAGE (OUTPATIENT)
Dept: FAMILY MEDICINE | Facility: CLINIC | Age: 75
End: 2020-01-22

## 2020-01-22 ENCOUNTER — CLINICAL SUPPORT (OUTPATIENT)
Dept: REHABILITATION | Facility: HOSPITAL | Age: 75
End: 2020-01-22
Payer: COMMERCIAL

## 2020-01-22 DIAGNOSIS — M25.551 PAIN OF RIGHT HIP JOINT: ICD-10-CM

## 2020-01-22 DIAGNOSIS — E11.65 TYPE 2 DIABETES MELLITUS WITH HYPERGLYCEMIA, WITHOUT LONG-TERM CURRENT USE OF INSULIN: Primary | ICD-10-CM

## 2020-01-22 DIAGNOSIS — M54.41 CHRONIC RIGHT-SIDED LOW BACK PAIN WITH RIGHT-SIDED SCIATICA: ICD-10-CM

## 2020-01-22 DIAGNOSIS — G89.29 CHRONIC RIGHT-SIDED LOW BACK PAIN WITH RIGHT-SIDED SCIATICA: ICD-10-CM

## 2020-01-22 PROCEDURE — 97110 THERAPEUTIC EXERCISES: CPT | Mod: PN

## 2020-01-22 RX ORDER — GLIMEPIRIDE 4 MG/1
4 TABLET ORAL
Qty: 90 TABLET | Refills: 0 | Status: SHIPPED | OUTPATIENT
Start: 2020-01-22 | End: 2020-05-20 | Stop reason: SDUPTHER

## 2020-01-22 NOTE — PROGRESS NOTES
PT/PTA met face to face to discuss pt's treatment plan and progress towards established goals. Pt will be seen by a physical therapist minimally every 6th visit or every 30 days.    Please see Updated Plan of Care for changes and updated goals.    Carlos Dougherty, PT     Devyn Lebron, PTA    Jackson Michael, PTA

## 2020-01-22 NOTE — PROGRESS NOTES
"  Physical Therapy Daily Treatment Note     Name: Joann Adrian  Clinic Number: [id number]    Therapy Diagnosis:   Encounter Diagnoses   Name Primary?    Chronic right-sided low back pain with right-sided sciatica     Pain of right hip joint      Physician: Karel Matthew DO    Visit Date: 1/22/2020    Physician Orders: PT Eval and Treat   Medical Diagnosis from Referral: M54.31 (ICD-10-CM) - Sciatica of right side  Evaluation Date: 1/15/2020  Authorization Period Expiration: 12/31/2020  Plan of Care Expiration: 3/11/2020  Visit # / Visits authorized: 2/50  FOTO: 2/10     Time In: 9:15  Time Out: 10:00  Total Billable Time: 45 minutes (3 TE)     Precautions: Standard, Diabetes and CAD    Subjective     Pt reports: His main complaint is right sided posterior lateral low back and upper leg pain that is episodic  He was compliant with home exercise program.  Response to previous treatment: decreased right leg pain  Functional change: no change    Pain: 0/10  Location: right side of low back and upper leg pain    Objective     KD received therapeutic exercises to develop strength, endurance, ROM, flexibility, posture and core stabilization for 55 minutes including:  Supine hip abduction with strap 5" hold, 30x  Clamshells 2x12 bilateral  Piriformis stretch 20" hold, 4x bilateral  Bridges: 2x10 DL (mild R leg pain)  LTRs: 3'  Glut sets: 10x10''  Seated pelvic tilts: 15x with 5'' holds into anterior pelvic tilt    Home Exercises Provided and Patient Education Provided   Education provided:   - HEP provided  - Home modalities prn    Written Home Exercises Provided: yes.  Exercises were reviewed and KD was able to demonstrate them prior to the end of the session.  KD demonstrated good  understanding of the education provided.     See EMR under Patient Instructions for exercises provided 1/15/2020.    Assessment     Some mild R upper lateral leg pain with bridges that dissipated soon after completion of exercises; no " other complaints of pain. Moderate cueing for pelvic tilts as pt has significant difficulty with anterior pelvic tilts.    KD is progressing well towards his goals.   Pt prognosis is Good.   Pt will continue to benefit from skilled outpatient physical therapy to address the deficits listed in the problem list box on initial evaluation, provide pt/family education and to maximize pt's level of independence in the home and community environment.      Pt's spiritual, cultural and educational needs considered and pt agreeable to plan of care and goals. MALE ONLY.  Anticipated barriers to physical therapy: Language barrier; age     Goals: Long Term Goals (8 Weeks):   1. Pt will be compliant with HEP to supplement PT in decreasing pain with functional mobility.  - progressing  2. Pt will improve impaired LE MMTs to >/=5/5 to improve strength for functional tasks. - progressing  3. Pt will improve FOTO score to </= 40% limited to decrease perceived limitation with maintaining/changing body position.  - progressing  4. Pt will report no pain during lumbar ROM to promote functional mobility. - progressing    Plan     Cont per POC.    Terry Lu, PT

## 2020-01-22 NOTE — TELEPHONE ENCOUNTER
Even after patient meets 3k deductible, will owe 50% of medication.     Stop trulicity, increase amaryl to 4 mg daily with breakfast    Monitor sugars.     Send me a message with blood sugar readings in 2 weeks.

## 2020-01-22 NOTE — PROGRESS NOTES
"                            Physical Therapy Daily Treatment Note     Name: Joann Adrian  Clinic Number: [id number]    Therapy Diagnosis: No diagnosis found.  Physician: Karel Matthew DO    Visit Date: 1/22/2020    Physician Orders: PT Eval and Treat   Medical Diagnosis from Referral: M54.31 (ICD-10-CM) - Sciatica of right side  Evaluation Date: 1/15/2020  Authorization Period Expiration: 12/31/2020  Plan of Care Expiration: 3/11/2020  Visit # / Visits authorized: 1/ 50  FOTO: 1/10    Time In: ***  Time Out: ***  Total Billable Time: *** minutes    Precautions: {IP WOUND PRECAUTIONS OHS:00030}    Subjective     Pt reports: ***.  He {Actions; was/was not:15248} compliant with home exercise program.  Response to previous treatment: ***  Functional change: ***    Pain: {0-10:39113::"0"}/10  Location: {RIGHT/LEFT/BILATERAL:07068} {LOCATION ON BODY:75310}     Objective     KD received therapeutic exercises to develop {AMB PT PROGRESS OBJECTIVE:12209} for *** minutes including:  Supine hip abduction with strap 5" hold, 30x  Clamshells 2x12 bilateral  Piriformis stretch 20" hold, 4x bilateral    KD received the following manual therapy techniques: {AMB PT PROGRESS MANUAL THERAPY:73272} were applied to the: *** for {5-30:31792} minutes, including:  ***      Home Exercises Provided and Patient Education Provided     Education provided:   - ***    Written Home Exercises Provided: Not today.  Exercises were reviewed and KDwas able to demonstrate them prior to the end of the session.  KD demonstrated good  understanding of the education provided.     See EMR under Patient Instructions for exercises provided 1/15/2020.    Assessment     ***    KD is progressing well towards his goals.   Pt prognosis is Good.   Pt will continue to benefit from skilled outpatient physical therapy to address the deficits listed in the problem list box on initial evaluation, provide pt/family education and to maximize pt's level of independence " in the home and community environment.     Pt's spiritual, cultural and educational needs considered and pt agreeable to plan of care and goals.  Anticipated barriers to physical therapy: Language barrier; age    Goals: Long Term Goals (8 Weeks):   1. Pt will be compliant with HEP to supplement PT in decreasing pain with functional mobility.  2. Pt will improve impaired LE MMTs to >/=5/5 to improve strength for functional tasks.  3. Pt will improve FOTO score to </= 40% limited to decrease perceived limitation with maintaining/changing body position.   4. Pt will report no pain during lumbar ROM to promote functional mobility.    Plan     ***    Adelita Reeves, PT, DPT

## 2020-02-11 ENCOUNTER — LAB VISIT (OUTPATIENT)
Dept: LAB | Facility: HOSPITAL | Age: 75
End: 2020-02-11
Attending: FAMILY MEDICINE
Payer: COMMERCIAL

## 2020-02-11 DIAGNOSIS — E11.65 TYPE 2 DIABETES MELLITUS WITH HYPERGLYCEMIA, WITHOUT LONG-TERM CURRENT USE OF INSULIN: ICD-10-CM

## 2020-02-11 DIAGNOSIS — N18.30 CKD (CHRONIC KIDNEY DISEASE) STAGE 3, GFR 30-59 ML/MIN: ICD-10-CM

## 2020-02-11 DIAGNOSIS — Z00.00 VISIT FOR WELL MAN HEALTH CHECK: ICD-10-CM

## 2020-02-11 LAB
ALBUMIN/CREAT UR: 346.8 UG/MG (ref 0–30)
BACTERIA #/AREA URNS AUTO: ABNORMAL /HPF
BILIRUB UR QL STRIP: NEGATIVE
CLARITY UR REFRACT.AUTO: ABNORMAL
COLOR UR AUTO: YELLOW
CREAT UR-MCNC: 233 MG/DL (ref 23–375)
GLUCOSE UR QL STRIP: NEGATIVE
HGB UR QL STRIP: NEGATIVE
HYALINE CASTS UR QL AUTO: 2 /LPF
KETONES UR QL STRIP: NEGATIVE
LEUKOCYTE ESTERASE UR QL STRIP: NEGATIVE
MICROALBUMIN UR DL<=1MG/L-MCNC: 808 UG/ML
MICROSCOPIC COMMENT: ABNORMAL
NITRITE UR QL STRIP: NEGATIVE
PH UR STRIP: 5 [PH] (ref 5–8)
PROT UR QL STRIP: ABNORMAL
RBC #/AREA URNS AUTO: 6 /HPF (ref 0–4)
SP GR UR STRIP: 1.02 (ref 1–1.03)
SQUAMOUS #/AREA URNS AUTO: 1 /HPF
URN SPEC COLLECT METH UR: ABNORMAL
WBC #/AREA URNS AUTO: 1 /HPF (ref 0–5)

## 2020-02-11 PROCEDURE — 81001 URINALYSIS AUTO W/SCOPE: CPT

## 2020-02-11 PROCEDURE — 87086 URINE CULTURE/COLONY COUNT: CPT

## 2020-02-11 PROCEDURE — 82043 UR ALBUMIN QUANTITATIVE: CPT

## 2020-02-12 DIAGNOSIS — R80.9 POSITIVE FOR MACROALBUMINURIA: ICD-10-CM

## 2020-02-12 DIAGNOSIS — R80.9 PROTEINURIA, UNSPECIFIED TYPE: ICD-10-CM

## 2020-02-12 DIAGNOSIS — N18.30 CKD (CHRONIC KIDNEY DISEASE) STAGE 3, GFR 30-59 ML/MIN: Primary | ICD-10-CM

## 2020-02-12 LAB — BACTERIA UR CULT: NO GROWTH

## 2020-02-13 ENCOUNTER — TELEPHONE (OUTPATIENT)
Dept: ADMINISTRATIVE | Facility: OTHER | Age: 75
End: 2020-02-13

## 2020-02-14 ENCOUNTER — PATIENT MESSAGE (OUTPATIENT)
Dept: FAMILY MEDICINE | Facility: CLINIC | Age: 75
End: 2020-02-14

## 2020-02-19 NOTE — PROGRESS NOTES
Subjective:       Patient ID: Joann Adrian is a 74 y.o. male.    Chief Complaint: Follow-up; Abdominal Pain; and Headache (more of the forehead pt son thinks it come from the shingles)    LOW is a 74 y.o. male who presents today for follow up on his DM, HTN, DLD, and CAD     DM: still elevated.   GI Problem: CT performed. no acute issues. Right lower lobe 0.9 cm nodule. Gallstones. Repeat imaging in 3 months. He has had abdominal pain in the LUQ. It occurs spontaneously. Palpating it relieves the pain. This happens daily. On PPI, not resolving. Food doesn't have any association.   DLD: Started atorvastatin 80 mg for cholesterol and CAD. 80 mg possibly caused GI issues. On 40 mg Now. LDL <70  HTN: on bisoprolol 5 mg, amlodipine 10 mg, valsartan 160 mg for HTN. BP controlled.   CAD: started statin. Couldn't tolerate 80 mg. On 40 mg now.    Having occasional headaches. Starts on the occiput to the right forehead. This comes and goes. No tears in the eyes.     Labs as below reviewed in detail     Review of Systems   Constitutional: Negative for chills and fever.   Eyes: Negative for visual disturbance.   Respiratory: Negative for cough and shortness of breath.    Gastrointestinal: Positive for abdominal pain. Negative for anal bleeding, blood in stool, constipation, diarrhea, nausea and vomiting.   Genitourinary: Negative for difficulty urinating and dysuria.   Neurological: Positive for headaches. Negative for dizziness and light-headedness.             Results for orders placed or performed in visit on 02/11/20   CBC auto differential   Result Value Ref Range    WBC 7.21 3.90 - 12.70 K/uL    RBC 5.16 4.60 - 6.20 M/uL    Hemoglobin 14.2 14.0 - 18.0 g/dL    Hematocrit 45.9 40.0 - 54.0 %    Mean Corpuscular Volume 89 82 - 98 fL    Mean Corpuscular Hemoglobin 27.5 27.0 - 31.0 pg    Mean Corpuscular Hemoglobin Conc 30.9 (L) 32.0 - 36.0 g/dL    RDW 15.5 (H) 11.5 - 14.5 %    Platelets 200 150 - 350 K/uL    MPV 10.5 9.2 -  12.9 fL    Immature Granulocytes 0.3 0.0 - 0.5 %    Gran # (ANC) 4.2 1.8 - 7.7 K/uL    Immature Grans (Abs) 0.02 0.00 - 0.04 K/uL    Lymph # 1.7 1.0 - 4.8 K/uL    Mono # 0.9 0.3 - 1.0 K/uL    Eos # 0.4 0.0 - 0.5 K/uL    Baso # 0.06 0.00 - 0.20 K/uL    nRBC 0 0 /100 WBC    Gran% 58.2 38.0 - 73.0 %    Lymph% 23.9 18.0 - 48.0 %    Mono% 11.9 4.0 - 15.0 %    Eosinophil% 4.9 0.0 - 8.0 %    Basophil% 0.8 0.0 - 1.9 %    Differential Method Automated    Comprehensive metabolic panel   Result Value Ref Range    Sodium 139 136 - 145 mmol/L    Potassium 4.8 3.5 - 5.1 mmol/L    Chloride 105 95 - 110 mmol/L    CO2 25 23 - 29 mmol/L    Glucose 119 (H) 70 - 110 mg/dL    BUN, Bld 26 (H) 8 - 23 mg/dL    Creatinine 2.0 (H) 0.5 - 1.4 mg/dL    Calcium 9.7 8.7 - 10.5 mg/dL    Total Protein 8.3 6.0 - 8.4 g/dL    Albumin 3.7 3.5 - 5.2 g/dL    Total Bilirubin 0.5 0.1 - 1.0 mg/dL    Alkaline Phosphatase 82 55 - 135 U/L    AST 30 10 - 40 U/L    ALT 32 10 - 44 U/L    Anion Gap 9 8 - 16 mmol/L    eGFR if African American 37.2 (A) >60 mL/min/1.73 m^2    eGFR if non  32.2 (A) >60 mL/min/1.73 m^2   Hemoglobin A1c   Result Value Ref Range    Hemoglobin A1C 8.1 (H) 4.0 - 5.6 %    Estimated Avg Glucose 186 (H) 68 - 131 mg/dL   Lipid panel   Result Value Ref Range    Cholesterol 104 (L) 120 - 199 mg/dL    Triglycerides 166 (H) 30 - 150 mg/dL    HDL 31 (L) 40 - 75 mg/dL    LDL Cholesterol 39.8 (L) 63.0 - 159.0 mg/dL    Hdl/Cholesterol Ratio 29.8 20.0 - 50.0 %    Total Cholesterol/HDL Ratio 3.4 2.0 - 5.0    Non-HDL Cholesterol 73 mg/dL   Vitamin D   Result Value Ref Range    Vit D, 25-Hydroxy 31 30 - 96 ng/mL   Hepatitis C antibody   Result Value Ref Range    Hepatitis C Ab Negative Negative       Objective:     Vitals:    02/20/20 1054   Pulse: 70        Physical Exam   Constitutional: He is oriented to person, place, and time. He appears well-developed and well-nourished.   HENT:   Head: Normocephalic and atraumatic.   Eyes:  Conjunctivae are normal.   Neck: Normal range of motion. Neck supple.   Cardiovascular: Normal rate and regular rhythm.   Midline sternal scar noted   Pulmonary/Chest: Effort normal and breath sounds normal.   Abdominal: Soft. There is tenderness in the left upper quadrant. There is no rigidity, no rebound, no guarding, no CVA tenderness and negative Raymond's sign.   Positive Carnett's sign (LUQ)   Musculoskeletal: He exhibits tenderness. He exhibits no edema.   TTP of the right first rib    Feet:   Left Foot:   Skin Integrity: Negative for callus.   Neurological: He is alert and oriented to person, place, and time.   Skin: Skin is warm.   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 His behavior is normal. Judgment and thought content normal.   Nursing note and vitals reviewed.      Assessment:       1. Uncontrolled type 2 diabetes mellitus with hyperglycemia    2. Type 2 diabetes mellitus with stage 3 chronic kidney disease, without long-term current use of insulin    3. CKD (chronic kidney disease) stage 3, GFR 30-59 ml/min    4. Essential hypertension    5. Other hyperlipidemia    6. Left upper quadrant pain    7. Neuropathic pain    8. Tension headache        Plan:         continue atorvastatin but at 40 mg for cholesterol and CAD  Continue bisoprolol 5 mg for your heart/HTN  Continue amlodipine 10 mg for HTN  Start valsartan 160 mg for HTN  Continue metformin 1000 mg Twice daily  Continue amaryl but at 4 MG daily  Can not give trulicity due to cost  Continue gabapentin    Have to closely monitor metformin and amaryl due to CKD and age  No change in medication at this time.Advised frequent self blood glucose monitoring.  Patient encouraged to document glucose results and bring them to every clinic visit. Close adherence to lifestyle changes recommended     No NSAIDS such as ibuprofen or naproxen. Avoid Red meats. Drink a lot of fluids. Continue monitoring CMP  q3-6 months. Refer to renal    Abdominal pain, anterior cutaneous nerve entrapment syndrome? Refer to Dr. Davis. Positive Carnet's sign. Unclear what GI workup would show at this time given normal CT Abdomen and no association with food and no dysphagia.     Addendum: Discussed with pain management, they want GI referral PRIOR to pain apt. Referral placed.     Uncontrolled type 2 diabetes mellitus with hyperglycemia  -     CBC auto differential; Future; Expected date: 02/20/2020  -     Comprehensive metabolic panel; Future; Expected date: 02/20/2020  -     Hemoglobin A1c; Future; Expected date: 02/20/2020    Type 2 diabetes mellitus with stage 3 chronic kidney disease, without long-term current use of insulin  -     CBC auto differential; Future; Expected date: 02/20/2020  -     Comprehensive metabolic panel; Future; Expected date: 02/20/2020  -     Hemoglobin A1c; Future; Expected date: 02/20/2020    CKD (chronic kidney disease) stage 3, GFR 30-59 ml/min  -     CBC auto differential; Future; Expected date: 02/20/2020  -     Comprehensive metabolic panel; Future; Expected date: 02/20/2020  -     Hemoglobin A1c; Future; Expected date: 02/20/2020    Essential hypertension    Other hyperlipidemia    Left upper quadrant pain  -     Cancel: Ambulatory referral/consult to Gastroenterology; Future; Expected date: 02/27/2020  -     Ambulatory referral/consult to Pain Clinic; Future; Expected date: 02/27/2020    Neuropathic pain  -     Ambulatory referral/consult to Pain Clinic; Future; Expected date: 02/27/2020    Tension headache            Warning signs discussed, patient to call with any further issues or worsening of symptoms.

## 2020-02-20 ENCOUNTER — OFFICE VISIT (OUTPATIENT)
Dept: FAMILY MEDICINE | Facility: CLINIC | Age: 75
End: 2020-02-20
Payer: COMMERCIAL

## 2020-02-20 VITALS — WEIGHT: 179.88 LBS | HEIGHT: 66 IN | BODY MASS INDEX: 28.91 KG/M2 | HEART RATE: 70 BPM | OXYGEN SATURATION: 98 %

## 2020-02-20 DIAGNOSIS — N18.30 TYPE 2 DIABETES MELLITUS WITH STAGE 3 CHRONIC KIDNEY DISEASE, WITHOUT LONG-TERM CURRENT USE OF INSULIN: ICD-10-CM

## 2020-02-20 DIAGNOSIS — I10 ESSENTIAL HYPERTENSION: ICD-10-CM

## 2020-02-20 DIAGNOSIS — N18.30 CKD (CHRONIC KIDNEY DISEASE) STAGE 3, GFR 30-59 ML/MIN: ICD-10-CM

## 2020-02-20 DIAGNOSIS — G44.209 TENSION HEADACHE: ICD-10-CM

## 2020-02-20 DIAGNOSIS — E11.65 UNCONTROLLED TYPE 2 DIABETES MELLITUS WITH HYPERGLYCEMIA: Primary | ICD-10-CM

## 2020-02-20 DIAGNOSIS — E11.22 TYPE 2 DIABETES MELLITUS WITH STAGE 3 CHRONIC KIDNEY DISEASE, WITHOUT LONG-TERM CURRENT USE OF INSULIN: ICD-10-CM

## 2020-02-20 DIAGNOSIS — E78.49 OTHER HYPERLIPIDEMIA: ICD-10-CM

## 2020-02-20 DIAGNOSIS — M79.2 NEUROPATHIC PAIN: ICD-10-CM

## 2020-02-20 DIAGNOSIS — R10.12 LEFT UPPER QUADRANT PAIN: ICD-10-CM

## 2020-02-20 PROCEDURE — 3052F PR MOST RECENT HEMOGLOBIN A1C LEVEL 8.0 - < 9.0%: ICD-10-PCS | Mod: CPTII,S$GLB,, | Performed by: FAMILY MEDICINE

## 2020-02-20 PROCEDURE — 99999 PR PBB SHADOW E&M-EST. PATIENT-LVL IV: CPT | Mod: PBBFAC,,, | Performed by: FAMILY MEDICINE

## 2020-02-20 PROCEDURE — 99214 PR OFFICE/OUTPT VISIT, EST, LEVL IV, 30-39 MIN: ICD-10-PCS | Mod: S$GLB,,, | Performed by: FAMILY MEDICINE

## 2020-02-20 PROCEDURE — 1125F AMNT PAIN NOTED PAIN PRSNT: CPT | Mod: S$GLB,,, | Performed by: FAMILY MEDICINE

## 2020-02-20 PROCEDURE — 1101F PR PT FALLS ASSESS DOC 0-1 FALLS W/OUT INJ PAST YR: ICD-10-PCS | Mod: CPTII,S$GLB,, | Performed by: FAMILY MEDICINE

## 2020-02-20 PROCEDURE — 1101F PT FALLS ASSESS-DOCD LE1/YR: CPT | Mod: CPTII,S$GLB,, | Performed by: FAMILY MEDICINE

## 2020-02-20 PROCEDURE — 3052F HG A1C>EQUAL 8.0%<EQUAL 9.0%: CPT | Mod: CPTII,S$GLB,, | Performed by: FAMILY MEDICINE

## 2020-02-20 PROCEDURE — 1159F MED LIST DOCD IN RCRD: CPT | Mod: S$GLB,,, | Performed by: FAMILY MEDICINE

## 2020-02-20 PROCEDURE — 99214 OFFICE O/P EST MOD 30 MIN: CPT | Mod: S$GLB,,, | Performed by: FAMILY MEDICINE

## 2020-02-20 PROCEDURE — 1159F PR MEDICATION LIST DOCUMENTED IN MEDICAL RECORD: ICD-10-PCS | Mod: S$GLB,,, | Performed by: FAMILY MEDICINE

## 2020-02-20 PROCEDURE — 99999 PR PBB SHADOW E&M-EST. PATIENT-LVL IV: ICD-10-PCS | Mod: PBBFAC,,, | Performed by: FAMILY MEDICINE

## 2020-02-20 PROCEDURE — 1125F PR PAIN SEVERITY QUANTIFIED, PAIN PRESENT: ICD-10-PCS | Mod: S$GLB,,, | Performed by: FAMILY MEDICINE

## 2020-02-20 NOTE — PATIENT INSTRUCTIONS
continue atorvastatin but at 40 mg for cholesterol and CAD  Continue bisoprolol 5 mg for your heart/HTN  Continue amlodipine 10 mg for HTN  Start valsartan 160 mg for HTN  Continue metformin 1000 mg Twice daily  Continue amaryl but at 4 MG daily  Can not give trulicity due to cost  Continue gabapentin    Have to closely monitor metformin and amaryl due to CKD and age  No change in medication at this time     Advised frequent self blood glucose monitoring.  Patient encouraged to document glucose results and bring them to every clinic visit. Will send me a log on patient portal in 2 weeks.  Close adherence to lifestyle changes recommended     No NSAIDS such as ibuprofen or naproxen. Avoid Red meats. Drink a lot of fluids. Continue monitoring CMP q3-6 months. Refer to renal    Abdominal pain, anterior cutaneous nerve entrapment syndrome? Refer to Dr. Davis. Positive Carnet's sign. Unclear what GI workup would show at this time given normal CT Abdomen and no association with food and no dysphagia.     Diabetes Management Status    Statin: Taking  ACE/ARB: Taking    Screening or Prevention Patient's value Goal Complete/Controlled?   HgA1C Testing and Control   Lab Results   Component Value Date    HGBA1C 8.1 (H) 02/11/2020      Annually/Less than 8% No   Lipid profile : 02/11/2020 Annually Yes   LDL control Lab Results   Component Value Date    LDLCALC 39.8 (L) 02/11/2020    Annually/Less than 100 mg/dl  Yes   Nephropathy screening Lab Results   Component Value Date    LABMICR 808.0 02/11/2020     Lab Results   Component Value Date    PROTEINUA 2+ (A) 02/11/2020    Annually Yes   Blood pressure BP Readings from Last 1 Encounters:   01/02/20 130/78    Less than 140/90 Yes   Dilated retinal exam Most Recent Eye Exam Date: Not Found Annually Yes   Foot exam   : 11/21/2019 Annually Yes             Tension Headache    A muscle tension headache is a very common cause of head pain. Its also called a stress headache. When some  people are under stress, they tense the muscles of their shoulder, neck, and scalp without knowing it. If this tension lasts long enough, a headache can occur. A tension headache can be quite painful. It can last for hours or even days.  Home care  Follow these tips when caring for yourself at home:  · Dont drive yourself home if you were given pain medicine for your headache. Instead, have someone else drive you home. Try to sleep when you get home. You should feel much better when you wake up.  · Put heat on the back of your neck to help ease neck spasm.  · Drink only clear liquids or eat a light diet until your symptoms get better. This will help you avoid nausea or vomiting.  How to prevent headaches  · Figure out what is causing stress in your life. Learn new ways to handle your stress. Ideas include regular exercise, biofeedback, self-hypnosis, yoga, and meditation. Talk with your healthcare provider to find out more information about managing stress. Many books and digital media are also available on this subject.  · Take time out at the first sign of a tension headache, if possible. Take yourself out of the stressful situation. Find a quiet, comfortable place to sit or lie down and let yourself relax. Heat and deep massage of the tight areas in the neck and shoulders may help ease muscle spasm. You may also get relief from a medicine like ibuprofen or a prescribed muscle relaxant.  Follow-up care  Follow up with your healthcare provider, or as advised. Talk with your provider if you have frequent headaches. He or she can figure out a treatment plan. Ask if you can have medicine to take at home the next time you get a bad headache. This may keep you from having to visit the emergency department in the future. You may need to see a headache specialist (neurologist) if you continue to have headaches.  When to seek medical advice  Call your healthcare provider right away if any of these occur:  · Your head pain  gets worse during sexual intercourse or strenuous activity  · Your head pain doesnt get better within 24 hours  · You arent able to keep liquids down (repeated vomiting)  · Fever of 100.4ºF (38ºC) or higher, or as directed by your healthcare provider  · Stiff neck  · Extreme drowsiness, confusion, or fainting  · Dizziness or dizziness with spinning sensation (vertigo)  · Weakness in an arm or leg or one side of your face  · You have difficulty speaking  · Your vision changes  Date Last Reviewed: 8/1/2016  © 2098-5030 [employer]. [address] [place] [address]. All rights reserved. This information is not intended as a substitute for professional medical care. Always follow your healthcare professional's instructions.

## 2020-02-24 PROBLEM — M54.41 CHRONIC RIGHT-SIDED LOW BACK PAIN WITH RIGHT-SIDED SCIATICA: Status: RESOLVED | Noted: 2020-01-15 | Resolved: 2020-02-24

## 2020-02-24 PROBLEM — G89.29 CHRONIC RIGHT-SIDED LOW BACK PAIN WITH RIGHT-SIDED SCIATICA: Status: RESOLVED | Noted: 2020-01-15 | Resolved: 2020-02-24

## 2020-02-24 PROBLEM — M25.551 PAIN OF RIGHT HIP JOINT: Status: RESOLVED | Noted: 2020-01-15 | Resolved: 2020-02-24

## 2020-03-04 ENCOUNTER — PATIENT OUTREACH (OUTPATIENT)
Dept: ADMINISTRATIVE | Facility: OTHER | Age: 75
End: 2020-03-04

## 2020-03-05 ENCOUNTER — OFFICE VISIT (OUTPATIENT)
Dept: GASTROENTEROLOGY | Facility: CLINIC | Age: 75
End: 2020-03-05
Payer: COMMERCIAL

## 2020-03-05 VITALS
SYSTOLIC BLOOD PRESSURE: 136 MMHG | DIASTOLIC BLOOD PRESSURE: 82 MMHG | HEART RATE: 79 BPM | WEIGHT: 185 LBS | HEIGHT: 66 IN | BODY MASS INDEX: 29.73 KG/M2

## 2020-03-05 DIAGNOSIS — K40.90 LEFT INGUINAL HERNIA: Chronic | ICD-10-CM

## 2020-03-05 DIAGNOSIS — R10.12 LEFT UPPER QUADRANT PAIN: ICD-10-CM

## 2020-03-05 DIAGNOSIS — R07.81 COSTAL MARGIN PAIN: ICD-10-CM

## 2020-03-05 PROCEDURE — 99999 PR PBB SHADOW E&M-EST. PATIENT-LVL IV: CPT | Mod: PBBFAC,,, | Performed by: INTERNAL MEDICINE

## 2020-03-05 PROCEDURE — 99204 OFFICE O/P NEW MOD 45 MIN: CPT | Mod: S$GLB,,, | Performed by: INTERNAL MEDICINE

## 2020-03-05 PROCEDURE — 99204 PR OFFICE/OUTPT VISIT, NEW, LEVL IV, 45-59 MIN: ICD-10-PCS | Mod: S$GLB,,, | Performed by: INTERNAL MEDICINE

## 2020-03-05 PROCEDURE — 99999 PR PBB SHADOW E&M-EST. PATIENT-LVL IV: ICD-10-PCS | Mod: PBBFAC,,, | Performed by: INTERNAL MEDICINE

## 2020-03-05 NOTE — LETTER
March 5, 2020      Karel Matthew, DO  [address]  [place] [address]           [facility]  [address]  [place] [address]  Phone: [phone]          Patient: Joann Adrian   MR Number: [id number]   YOB: 1945   Date of Visit: 3/5/2020       Dear Dr. Karel Matthew:    Thank you for referring Joann Adrian to me for evaluation. Attached you will find relevant portions of my assessment and plan of care.    If you have questions, please do not hesitate to call me. I look forward to following Joann Adrian along with you.    Sincerely,    Reji Quintero MD    Enclosure  CC:  Sera Davis MD    If you would like to receive this communication electronically, please contact [email or web address] or [phone] to request more information on [employer] Link access.    For providers and/or their staff who would like to refer a patient to [facility], please contact us through our one-stop-shop provider referral line, [facility], at [phone].    If you feel you have received this communication in error or would no longer like to receive these types of communications, please e-mail [email or web address]

## 2020-03-05 NOTE — PROGRESS NOTES
GASTROENTEROLOGY CLINIC NOTE    Reason for visit: Diagnoses of Left upper quadrant pain, Costal margin pain, and Left inguinal hernia were pertinent to this visit.  Referring provider/PCP: Karel Matthew DO    HPI:  Joann Adrian is a 74 y.o. male here today for eval of luq pain.   accompanied by his son who provides translation and history. Declined translation services offering.  Hx of DM, CAD. On ASA daily    Over the past 8 months or so patient has experienced left upper quadrant pain.  This is in the subcostal area on the left side.  There is some movement of the pain down to the mid abdomen.  There is no radiation of the pain to the back.  There is no relation with bowel habits.  He is having fairly regular bowel movements every day or twice a day.  He does not get any relief with having a bowel movement.  There is no blood in the stool.  There is no change in the pain with oral intake.  He is eating a regular diet and not having any issues.  There is no vomiting.  There is no weight loss.    Following with PCP who has started DM and statin medication, about 3 months ago.  Referral in to pain med for possible nerve entrapement, requested to be seen in GI first.      Prior Endoscopy:  EGD: / Colon: none    (Portions of this note were dictated using voice recognition software and may contain dictation related errors in spelling/grammar/syntax not found on text review)    Review of Systems   Constitutional: Negative for fever and weight loss.   HENT: Negative for nosebleeds and sore throat.    Eyes: Negative for double vision and photophobia.   Respiratory: Negative for cough and shortness of breath.    Cardiovascular: Negative for chest pain and leg swelling.   Gastrointestinal: Positive for abdominal pain. Negative for blood in stool and vomiting.   Genitourinary: Negative for dysuria and hematuria.   Musculoskeletal: Negative for joint pain and neck pain.   Skin: Negative for itching and rash.    Neurological: Negative for dizziness and headaches.   Psychiatric/Behavioral: Negative for hallucinations. The patient does not have insomnia.        Past Medical History: has a past medical history of Coronary artery disease, Diabetes mellitus, type 2, Hyperlipidemia, and Hypertension.    Past Surgical History: has a past surgical history that includes Coronary artery bypass graft and Cataract extraction, bilateral.    Family History:family history includes Liver disease in his sister.    Allergies: Review of patient's allergies indicates:  No Known Allergies    Social History: reports that he has never smoked. He has never used smokeless tobacco. He reports that he drank alcohol. He reports that he has current or past drug history.    Home medications:   Current Outpatient Medications on File Prior to Visit   Medication Sig Dispense Refill    amLODIPine (NORVASC) 10 MG tablet Take 1 tablet (10 mg total) by mouth once daily. 90 tablet 1    atorvastatin (LIPITOR) 40 MG tablet Take 1 tablet (40 mg total) by mouth once daily. 90 tablet 3    bisoprolol (ZEBETA) 5 MG tablet Take 1 tablet (5 mg total) by mouth once daily. 90 tablet 0    gabapentin (NEURONTIN) 300 MG capsule Take 1 capsule (300 mg total) by mouth every evening. 90 capsule 0    glimepiride (AMARYL) 4 MG tablet Take 1 tablet (4 mg total) by mouth daily with breakfast. 90 tablet 0    metFORMIN (GLUCOPHAGE) 1000 MG tablet Take 1 tablet (1,000 mg total) by mouth 2 (two) times daily with meals. 180 tablet 0    omeprazole (PRILOSEC) 40 MG capsule Take 1 capsule (40 mg total) by mouth once daily. 90 capsule 0    OPW TEST CLAIM - DO NOT FILL OPW test claim. Do not fill. 28 mL 0    valsartan (DIOVAN) 160 MG tablet Take 1 tablet (160 mg total) by mouth once daily. 90 tablet 0    aspirin 81 MG Chew Take 1 tablet (81 mg total) by mouth once daily. (Patient not taking: Reported on 3/5/2020)       No current facility-administered medications on file prior to  "visit.        Vital signs:  /82   Pulse 79   Ht 5' 6" (1.676 m)   Wt 83.9 kg (185 lb)   BMI 29.86 kg/m²     Physical Exam   Constitutional: He is oriented to person, place, and time. He appears well-developed and well-nourished. No distress.   HENT:   Head: Normocephalic and atraumatic.   Eyes: Conjunctivae are normal. No scleral icterus.   Neck: Neck supple. No thyromegaly present.   Cardiovascular: Normal rate, regular rhythm and intact distal pulses.   Pulmonary/Chest: Effort normal and breath sounds normal. No respiratory distress.   Abdominal: Soft. He exhibits no distension.   There is tenderness maximally over the left costal margin ; small skin scratches over the mid left abdomen, nontender ; no scars otherwise over area  abd soft without rigidity ; no rebound  Appreciate small left inguinal hernia.   Musculoskeletal: Normal range of motion. He exhibits no tenderness.   No back pain on palpation ; no overlying skin changes   Neurological: He is alert and oriented to person, place, and time.   Skin: Skin is warm and dry. No rash noted.   Psychiatric: He has a normal mood and affect. His behavior is normal.   Vitals reviewed.      Routine labs:  Lab Results   Component Value Date    WBC 7.21 02/11/2020    HGB 14.2 02/11/2020    HCT 45.9 02/11/2020    MCV 89 02/11/2020     02/11/2020     No results found for: INR  No results found for: IRON, FERRITIN, TIBC, FESATURATED  Lab Results   Component Value Date     02/11/2020    K 4.8 02/11/2020     02/11/2020    CO2 25 02/11/2020    BUN 26 (H) 02/11/2020    CREATININE 2.0 (H) 02/11/2020     Lab Results   Component Value Date    ALBUMIN 3.7 02/11/2020    ALT 32 02/11/2020    AST 30 02/11/2020    ALKPHOS 82 02/11/2020    BILITOT 0.5 02/11/2020     No results found for: GLUCOSE    I have reviewed prior labs, imaging, notes from last month  Reviewed CT from beginning January 1. No acute abnormalities in the abdomen or pelvis.  2. Right lower " lobe pulmonary nodule measuring 0.9 cm.  Per Fleischner Society 2017 guidelines, recommend follow-up CT chest in 3 months.  3. Many borderline enlarged retroperitoneal nodes and a mildly enlarged portacaval lymph node.  These are nonspecific.  Consider short-term follow-up in 3 months.  4. Punctate calcifications probably within a gastrohepatic lymph node and adjacent to the esophagus in the lower mediastinum suggests prior granulomatous disease.  5. Cholelithiasis.  Also noted is left inguinal hernia.    Assessment:  1. Left upper quadrant pain    2. Costal margin pain    3. Left inguinal hernia          Plan:     Examination more consistent with a left costal pain.  I feel less likely that this is a GI related pain given no association with bowel habits or with eating, as well as the findings on physical exam.  I wish for him to see Dr. Davis first and have further evaluation, and consideration of injection for nerve entrapement etc.    If this is not successful or does not provide relief, then I will proceed with an EGD.  I also wish to see his findings on repeat CT that is already ordered with his primary care doctor. Await repeat CT with primary care doctor  Can also reassess the left inguinal hernia although his pain is more upper at this point.    RTC pending Dr. Susan bolton (already scheduled)   - will consider EGD pending his eval and response to injection.   - await CT results already ordered with primary doc.      Reji Quintero MD  [facility] [place]

## 2020-03-06 NOTE — PROGRESS NOTES
"Subjective:       Patient ID: Joann Adrian is a 74 y.o. [place] male who presents for new evaluation of  CKD    HPI   Patient is new to me. New to clinic.  Prior pertinent chart reviewed since this is patient's first appointment with me. Referred by PCP Dr. Matthew. Presents with son who interprets; refuses hospital interpretor.     Patient presents for new evaluation of CKD.  Baseline creatinine difficult to determine due to lack of available labs. sCr from outside labs was 1.7 in  Nov 2019. sCr in January was 1.6 mg/dl; sCr in February was 2.0 mg/dL, about a month after receiving IV contrast. No other labs available. Dr. Zamora, pain specialist, looking into abodminal tenderness.    Home BPs: 130s/70s    Says he feels ill every few days and lays down all day. Says he feels overall weakness these days.    Significant hx includes HTN since 2016, CAD s/p CABG, valve replacements x 2, HLD, DM2 since 2016, GERD.  [place]; unsure of the accuracy of the chronicity of patient's HTN and DM diagnosis.    On PPI. The patient denies taking NSAIDs, herbal supplements, recreational drugs, recent episode of dehydration, diarrhea, nausea or vomiting, hospitalization. Also on vitamin for "bone health" but does not know name or ingredients. Recently on amoxicillin. Had IV contrast 1/9/2020.    Significant family hx includes: liver disease; no known renal disease    Last renal US: none in EMR    Review of Systems   Constitutional: Positive for appetite change (decreased) and fatigue.   HENT: Negative for facial swelling.    Respiratory: Negative for cough, shortness of breath, wheezing and stridor.    Cardiovascular: Positive for leg swelling. Negative for chest pain and palpitations.   Gastrointestinal: Positive for constipation. Negative for diarrhea, nausea and vomiting.   Genitourinary: Positive for frequency and urgency (occasional). Negative for difficulty urinating, dysuria, flank " "pain and hematuria.   Musculoskeletal: Positive for back pain (low).   Skin: Negative for rash.   Neurological: Positive for weakness. Negative for dizziness and light-headedness.   Psychiatric/Behavioral: Negative for sleep disturbance.       Objective:       Blood pressure 138/80, pulse 84, height 5' 6" (1.676 m), weight 83.2 kg (183 lb 6.8 oz), SpO2 99 %.  Physical Exam   Constitutional: He is oriented to person, place, and time. He appears well-developed and well-nourished. No distress.   HENT:   Mouth/Throat: Oropharynx is clear and moist.   Eyes: Conjunctivae are normal.   Neck: Neck supple.   Cardiovascular: Normal rate, regular rhythm and normal heart sounds. Exam reveals no gallop and no friction rub.   No murmur heard.  Pulmonary/Chest: Effort normal and breath sounds normal. No stridor. No respiratory distress. He has no wheezes. He has no rales.   Abdominal: Soft. Bowel sounds are normal. He exhibits no distension. There is tenderness (mild RUQ - being investigated by Dr. Zamora per son). There is CVA tenderness (mild to left). There is no guarding.   Musculoskeletal: He exhibits edema (trace BLE).   Neurological: He is alert and oriented to person, place, and time.   Skin: Skin is warm and dry. He is not diaphoretic. No pallor.   Psychiatric: He has a normal mood and affect. His behavior is normal.   Vitals reviewed.        Crt: 2.0 mg/dL  Hgb: 14.2 g/dL  CO2: 25 mmol/L  UPCR: not available  ACR: 346.8  A1c: 8.1%    Assessment:       1. CKD (chronic kidney disease) stage 3, GFR 30-59 ml/min    2. Positive for macroalbuminuria    3. Proteinuria, unspecified type    4. Hematuria syndrome    5. Flank pain        Plan:   CKD stage 3B c eGFR 32-42 mL/min c superimposed ULI- CKD etiology likely due to vascular disease (has had CABG in 2003), possible component of HTN and DM (albuminuria), especially if these diseases are more longstanding than patient reports. ULI likely due to contrast-induced nephropathy. " Will repeat labs, urine, and ultrasound.    UPCR Proteinuric. Need to obtain UPCR. On valsartan 160 mg.   Acid-base WNL.   Renal osteodystrophy Ca okay. Need to obtain PTH.   Anemia WNL.   DM Above goal. Recently started on glimepiride, metformin.   Lipid Management On statin per PCP.   ESRD planning Anticipatory guidance provided about timing of dialysis. Start discussions and planning when eGFR is about 20 mL/min; most patients start dialysis between 5-10 mL/min.     HTN - Slightly above goal. on amlodipine 10 mg, valsartan 160 mg, bisoprolol 5 mg daily; ideally would like to increase valsartan up to 320 mg for proteinuria and optimal BP control, but need to check status of ULI first. Goal: < 130/80    Microhematuria - Has persisted on UA x 2. Refer to urology.    CVA tenderness - US, UA, urine culture    All questions patient had were answered.  Asked if further questions. None. F/u in clinic in 3 mos with labs and urine prior to next visit or sooner if needed.  ER for emergency concerns.    Summary of Plan:  1. RFP, CBC, UA, UPCR, US  2. Urine culture  3. Refer to urology  4. Avoid NSAIDs, IV contrast, Bactrim, aminoglycosides. If pt needs IV contrast, would recommend IVF before and after contrast administration to decrease risk of ULI and repeat labs in 48-72 hours after administration to monitor kidney function  5. RTC in 3 mos

## 2020-03-09 ENCOUNTER — OFFICE VISIT (OUTPATIENT)
Dept: OPTOMETRY | Facility: CLINIC | Age: 75
End: 2020-03-09
Payer: COMMERCIAL

## 2020-03-09 DIAGNOSIS — H26.493 BILATERAL POSTERIOR CAPSULAR OPACIFICATION: ICD-10-CM

## 2020-03-09 DIAGNOSIS — E11.3392 MODERATE NONPROLIFERATIVE DIABETIC RETINOPATHY OF LEFT EYE WITHOUT MACULAR EDEMA ASSOCIATED WITH TYPE 2 DIABETES MELLITUS: Primary | ICD-10-CM

## 2020-03-09 DIAGNOSIS — H04.123 DRY EYE SYNDROME OF BOTH EYES: ICD-10-CM

## 2020-03-09 PROCEDURE — 92004 COMPRE OPH EXAM NEW PT 1/>: CPT | Mod: S$GLB,,, | Performed by: OPTOMETRIST

## 2020-03-09 PROCEDURE — 92004 PR EYE EXAM, NEW PATIENT,COMPREHESV: ICD-10-PCS | Mod: S$GLB,,, | Performed by: OPTOMETRIST

## 2020-03-09 PROCEDURE — 99999 PR PBB SHADOW E&M-EST. PATIENT-LVL III: ICD-10-PCS | Mod: PBBFAC,,, | Performed by: OPTOMETRIST

## 2020-03-09 PROCEDURE — 99999 PR PBB SHADOW E&M-EST. PATIENT-LVL III: CPT | Mod: PBBFAC,,, | Performed by: OPTOMETRIST

## 2020-03-09 NOTE — PROGRESS NOTES
HPI     RADHA 1 year ago somewhere else. Type 2 DM.  two days ago. Patient   reports good distance vision. Does not wear glasses for distance. Patient   wears OTC +3.00 for reading but reports difficulty seeing at near with   glasses. Patient has been experiencing red eyes for several weeks. Denies   burning, itching, or irritation. Has been using Zaditor for 1 week,   reports it did not help with redness. Denies flashes or floaters.   Patient's son is here today and translating for patient.    Hemoglobin A1C       Date                     Value               Ref Range             Status                02/11/2020               8.1 (H)             4.0 - 5.6 %           Final              Last edited by Bhavana Sheriff, OD on 3/9/2020  1:24 PM. (History)            Assessment /Plan     For exam results, see Encounter Report.    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 Educated pt on findings and the importance of DM control. RTC in 4 months to re-evaluate retinopathy.  2. Educated patient and patient's son of today's findings and decreased vision. Refer patient to ophthalmology for YAG. No Spec Rx given at this visit due to little increase in vision with refraction. Patient can either return to me or have refraction done after YAG.  3. Educated pt on findings. Recommended OTC ATs such as Systane or Refresh 2-4x/day for added lubrication and comfort and gel drop qhs. D/c Zaditor. RTC if symptoms persist.

## 2020-03-09 NOTE — LETTER
March 9, 2020      Karel Matthew, DO  [address]  [place] [address]           [facility]  [address]  [place] [address]  Phone: [phone]  Fax: [phone]          Patient: Joann Adrian   MR Number: [id number]   YOB: 1945   Date of Visit: 3/9/2020       Dear Dr. Karel Matthew:    Thank you for referring Joann Adrian to me for evaluation. Attached you will find relevant portions of my assessment and plan of care.    If you have questions, please do not hesitate to call me. I look forward to following Joann Adrian along with you.    Sincerely,    Bhavana Sheriff, OD    Enclosure  CC:  No Recipients    If you would like to receive this communication electronically, please contact [email or web address] or [phone] to request more information on [employer] Link access.    For providers and/or their staff who would like to refer a patient to [facility], please contact us through our one-stop-shop provider referral line, [facility], at [phone].    If you feel you have received this communication in error or would no longer like to receive these types of communications, please e-mail [email or web address]

## 2020-03-10 ENCOUNTER — PATIENT MESSAGE (OUTPATIENT)
Dept: PAIN MEDICINE | Facility: CLINIC | Age: 75
End: 2020-03-10

## 2020-03-10 ENCOUNTER — OFFICE VISIT (OUTPATIENT)
Dept: PAIN MEDICINE | Facility: CLINIC | Age: 75
End: 2020-03-10
Payer: COMMERCIAL

## 2020-03-10 ENCOUNTER — HOSPITAL ENCOUNTER (OUTPATIENT)
Dept: RADIOLOGY | Facility: HOSPITAL | Age: 75
Discharge: HOME | End: 2020-03-10
Attending: PHYSICAL MEDICINE & REHABILITATION
Payer: COMMERCIAL

## 2020-03-10 ENCOUNTER — PATIENT OUTREACH (OUTPATIENT)
Dept: ADMINISTRATIVE | Facility: OTHER | Age: 75
End: 2020-03-10

## 2020-03-10 VITALS
HEART RATE: 78 BPM | SYSTOLIC BLOOD PRESSURE: 130 MMHG | WEIGHT: 185 LBS | DIASTOLIC BLOOD PRESSURE: 88 MMHG | BODY MASS INDEX: 29.86 KG/M2

## 2020-03-10 DIAGNOSIS — M76.31 ILIOTIBIAL BAND SYNDROME, RIGHT: ICD-10-CM

## 2020-03-10 DIAGNOSIS — R10.12 LEFT UPPER QUADRANT PAIN: ICD-10-CM

## 2020-03-10 DIAGNOSIS — M54.31 SCIATICA OF RIGHT SIDE: ICD-10-CM

## 2020-03-10 DIAGNOSIS — K43.9 VENTRAL HERNIA WITHOUT OBSTRUCTION OR GANGRENE: ICD-10-CM

## 2020-03-10 DIAGNOSIS — M54.41 CHRONIC RIGHT-SIDED LOW BACK PAIN WITH RIGHT-SIDED SCIATICA: ICD-10-CM

## 2020-03-10 DIAGNOSIS — M79.2 NEUROPATHIC PAIN: ICD-10-CM

## 2020-03-10 DIAGNOSIS — B02.29 POSTHERPETIC NEURALGIA: Primary | ICD-10-CM

## 2020-03-10 DIAGNOSIS — M79.18 MYOFASCIAL PAIN: ICD-10-CM

## 2020-03-10 DIAGNOSIS — M70.61 GREATER TROCHANTERIC BURSITIS OF RIGHT HIP: ICD-10-CM

## 2020-03-10 DIAGNOSIS — G89.29 CHRONIC RIGHT-SIDED LOW BACK PAIN WITH RIGHT-SIDED SCIATICA: ICD-10-CM

## 2020-03-10 DIAGNOSIS — M54.10 RADICULAR PAIN OF RIGHT LOWER EXTREMITY: ICD-10-CM

## 2020-03-10 DIAGNOSIS — M53.3 SACROILIAC JOINT DYSFUNCTION: ICD-10-CM

## 2020-03-10 PROCEDURE — 99999 PR PBB SHADOW E&M-EST. PATIENT-LVL IV: CPT | Mod: PBBFAC,,, | Performed by: PHYSICAL MEDICINE & REHABILITATION

## 2020-03-10 PROCEDURE — 72110 XR LUMBAR SPINE 5 VIEW WITH FLEX AND EXT: ICD-10-PCS | Mod: 26,,, | Performed by: RADIOLOGY

## 2020-03-10 PROCEDURE — 72110 X-RAY EXAM L-2 SPINE 4/>VWS: CPT | Mod: TC,FY

## 2020-03-10 PROCEDURE — 99999 PR PBB SHADOW E&M-EST. PATIENT-LVL IV: ICD-10-PCS | Mod: PBBFAC,,, | Performed by: PHYSICAL MEDICINE & REHABILITATION

## 2020-03-10 PROCEDURE — 72110 X-RAY EXAM L-2 SPINE 4/>VWS: CPT | Mod: 26,,, | Performed by: RADIOLOGY

## 2020-03-10 PROCEDURE — 99244 OFF/OP CNSLTJ NEW/EST MOD 40: CPT | Mod: S$GLB,,, | Performed by: PHYSICAL MEDICINE & REHABILITATION

## 2020-03-10 PROCEDURE — 99244 PR OFFICE CONSULTATION,LEVEL IV: ICD-10-PCS | Mod: S$GLB,,, | Performed by: PHYSICAL MEDICINE & REHABILITATION

## 2020-03-10 RX ORDER — GABAPENTIN 300 MG/1
600 CAPSULE ORAL NIGHTLY
Qty: 60 CAPSULE | Refills: 11 | Status: SHIPPED | OUTPATIENT
Start: 2020-03-10 | End: 2020-04-07

## 2020-03-10 NOTE — PROGRESS NOTES
[facility]  New Patient H&P    Referring Provider: Karel Matthew Do  [address] [facility]  [place] [address]    Chief Complaint:   Chief Complaint   Patient presents with    Abdominal Pain       History of Present Illness: Joann Adrian is a 74 y.o. male referred by Dr. Karel Matthew for LUQ abdominal pain.      He also presents with right sided face/head pain, and right hip/leg pain. Head/facial pain is his worst pain.     Onset: Shingles on right side of head 2 years ago, with persisting pain in that area. Two months of LUQ abdominal pain. Right hip and right SIJ pain laterally over GTB started 3 months ago. No clear inciting incident  Location: Right side of face above eye (V1 distribution). LUQ abdominal. Right lateral hip and SIJ.   Radiation: Right side of face radiates to back of head. LUQ abdominal pain radiates into lower quadrant on the left. Right hip pain radiates down the right leg.   Timing: constant  Quality: Face is burning. LUQ is burning, Aching and Dull. Right hip/leg pain feels aching  Exacerbating Factors: Touching the face. nothing in particular aggravates the abdomen or right hip/leg pain.   Alleviating Factors: medications  Associated Symptoms: denies night fever/night sweats, urinary incontinence, bowel incontinence, significant weight loss, significant motor weakness and loss of sensations    Severity: Currently: 6 /10   Typical Range: 1-8/10     Exacerbation: 7/10     He previously saw Dr. Eagle for pain.     Pain Disability Index  Family/Home Responsibilities:: 6  Recreation:: 6  Social Activity:: 6  Occupation:: 7  Sexual Behavior:: 0  Self Care:: 6  Life-Support Activities:: 6  Pain Disability Index (PDI): 37    Previous Interventions:  - None    Previous Therapies:  PT/OT: yes, doesn't feel like it is helping  Surgery: no surgery of spine or abdomen  Previous Medications:   - NSAIDS: Tylenol. Advil helps some  - Muscle Relaxants:    - TCAs:   - SNRIs:   - Topicals:   -  Anticonvulsants:    - Opioids: Took something from Dr. Bolivar which caused sedation    Current Pain Medications:  1. Tylenol  2. Gabapentin 300mg every evening     Blood Thinners: ASA 81 mg     Full Medication List:    Current Outpatient Medications:     amLODIPine (NORVASC) 10 MG tablet, Take 1 tablet (10 mg total) by mouth once daily., Disp: 90 tablet, Rfl: 1    aspirin 81 MG Chew, Take 1 tablet (81 mg total) by mouth once daily., Disp: , Rfl:     atorvastatin (LIPITOR) 40 MG tablet, Take 1 tablet (40 mg total) by mouth once daily., Disp: 90 tablet, Rfl: 3    bisoprolol (ZEBETA) 5 MG tablet, Take 1 tablet (5 mg total) by mouth once daily., Disp: 90 tablet, Rfl: 0    gabapentin (NEURONTIN) 300 MG capsule, Take 2 capsules (600 mg total) by mouth every evening., Disp: 60 capsule, Rfl: 11    glimepiride (AMARYL) 4 MG tablet, Take 1 tablet (4 mg total) by mouth daily with breakfast., Disp: 90 tablet, Rfl: 0    metFORMIN (GLUCOPHAGE) 1000 MG tablet, Take 1 tablet (1,000 mg total) by mouth 2 (two) times daily with meals., Disp: 180 tablet, Rfl: 0    omeprazole (PRILOSEC) 40 MG capsule, Take 1 capsule (40 mg total) by mouth once daily., Disp: 90 capsule, Rfl: 0    OPW TEST CLAIM - DO NOT FILL, OPW test claim. Do not fill., Disp: 28 mL, Rfl: 0    valsartan (DIOVAN) 160 MG tablet, Take 1 tablet (160 mg total) by mouth once daily., Disp: 90 tablet, Rfl: 0     Review of Systems:  Review of Systems   Constitutional: Negative for fever and weight loss.   HENT: Negative for ear pain and tinnitus.    Eyes: Positive for pain and redness.   Respiratory: Negative for cough and shortness of breath.    Cardiovascular: Negative for chest pain and palpitations.   Gastrointestinal: Positive for abdominal pain and constipation. Negative for heartburn.   Genitourinary: Positive for frequency.        Denies urinary incontinence. Denies urine retention.    Musculoskeletal: Positive for back pain. Negative for neck pain.   Skin:  Negative for itching and rash.   Neurological: Positive for dizziness, weakness and headaches. Negative for tingling and seizures.   Endo/Heme/Allergies: Negative for environmental allergies. Does not bruise/bleed easily.   Psychiatric/Behavioral: Negative for depression. The patient has insomnia. The patient is not nervous/anxious.        Allergies:  Patient has no known allergies.     Medical History:   has a past medical history of Cataract, Coronary artery disease, Diabetes mellitus, type 2, Hyperlipidemia, and Hypertension.    Surgical History:   has a past surgical history that includes Coronary artery bypass graft; Cataract extraction, bilateral; and Cataract extraction.    Family History:  family history includes Liver disease in his sister.    Social History:   reports that he has never smoked. He has never used smokeless tobacco. He reports that he drank alcohol. He reports that he has current or past drug history.    Physical Exam:  /88   Pulse 78   Wt 83.9 kg (185 lb)   BMI 29.86 kg/m²   GEN: No acute distress. Calm, comfortable  HENT: Normocephalic, atraumatic, moist mucous membranes  EYE: Anicteric sclera, non-injected.   CV: Non-diaphoretic. Regular Rate. Radial Pulses 2+.  RESP: Breathing comfortably. Chest expansion symmetric.  ABD: Ventral hernia apparent with leg lifting but not apparent at rest. (+) Carnett's sign in the LUQ. (+) LUQ TTP.   EXT: No clubbing, cyanosis.   SKIN: Warm, & dry to palpation. No visible rashes or lesions of exposed skin.   PSYCH: Pleasant mood and appropriate affect. Recent and remote memory intact.   GAIT: Independent, antalgic ambulation  Lumbar Spine Exam:       Inspection: No erythema, bruising. No surgical incisions      Palpation: (+) TTP of lumbar and SIJ on the right      ROM:  Limited in flexion, extension, lateral bending.       (+) Facet loading bilaterally      (-) Straight Leg Raise bilaterally      (+) WALLACE on the right  Hip Exam:      Inspection:  No gross deformity or apparent leg length discrepancy      Palpation: (+) TTP to right greater trochanteric bursa, ITB      ROM:  No significant limitation or pain in internal rotation, external rotation  Neurologic Exam:     Alert. Speech is fluent and appropriate.     Strength:  5/5 throughout bilateral lower extremities     Sensation:  Grossly intact to light touch in bilateral lower extremities     Reflexes: Absent in b/l biceps, patella, achilles. (+) 1 in b/l BR.    Tone: No abnormality appreciated in bilateral lower extremities     No Clonus     Downgoing toes on plantar stimulation     (-) Yeager bilaterally      Imaging:  - CT Abd Pelv w/ contrast 1/9/20:  1. No acute abnormalities in the abdomen or pelvis.  2. Right lower lobe pulmonary nodule measuring 0.9 cm.  Per Fleischner Society 2017 guidelines, recommend follow-up CT chest in 3 months.  3. Many borderline enlarged retroperitoneal nodes and a mildly enlarged portacaval lymph node.  These are nonspecific.  Consider short-term follow-up in 3 months.  4. Punctate calcifications probably within a gastrohepatic lymph node and adjacent to the esophagus in the lower mediastinum suggests prior granulomatous disease.  5. Cholelithiasis.    Labs:  BMP  Lab Results   Component Value Date     02/11/2020    K 4.8 02/11/2020     02/11/2020    CO2 25 02/11/2020    BUN 26 (H) 02/11/2020    CREATININE 2.0 (H) 02/11/2020    CALCIUM 9.7 02/11/2020    ANIONGAP 9 02/11/2020    ESTGFRAFRICA 37.2 (A) 02/11/2020    EGFRNONAA 32.2 (A) 02/11/2020     Lab Results   Component Value Date    ALT 32 02/11/2020    AST 30 02/11/2020    ALKPHOS 82 02/11/2020    BILITOT 0.5 02/11/2020     Lab Results   Component Value Date     02/11/2020     Lab Results   Component Value Date    LABA1C 9.0 (A) 11/12/2019    HGBA1C 8.1 (H) 02/11/2020     Assessment:  Joann Adrian is a 74 y.o. male with the following diagnoses based on history, exam, and imaging:    Problem List  Items Addressed This Visit     None      Visit Diagnoses     Postherpetic neuralgia    -  Primary    Left upper quadrant pain        Neuropathic pain        Ventral hernia without obstruction or gangrene        Relevant Orders    US Abdomen Limited    Greater trochanteric bursitis of right hip        Iliotibial band syndrome, right        Sacroiliac joint dysfunction        Radicular pain of right lower extremity        Relevant Orders    X-Ray Lumbar Complete With Flex And Ext    MRI Lumbar Spine Without Contrast    Chronic right-sided low back pain with right-sided sciatica        Relevant Orders    X-Ray Lumbar Complete With Flex And Ext    MRI Lumbar Spine Without Contrast    Sciatica of right side        Relevant Medications    gabapentin (NEURONTIN) 300 MG capsule    Myofascial pain              This is a pleasant 74 y.o. gentleman with CAD, hypertension, CKD, DM2, GERD,     Right Head/Facial Pain: Appears to be due to postherpetic neuralgia with history of shingles in the area.    Abdominal Pain: He does have a ventral hernia on exam. He also has TTP in LUQ and not around the hernia, which may be myofascial, as it does not appear visceral in nature.     Back pain appears multifactorial. 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     Treatment Plan:   - PT/OT/HEP: Patient just finished PT. Encouraged daily light aerobic exercise  - Procedures: Consider abdominal TPI after abdominal US. Will avoid if he is found to have ventral hernia.   - Consider right lumbar TF KELSIE after MRI.   - Medications:   - Recommend ECG with PCP and we may be able to start nortriptyline 10-25 mg qHS for PHN  - Increase Gabapentin to 600 mg at night.  Our goal will be 600 mg BID eventually. 1400 mg/day total max dose based off of renal function (GFR 30-59).   - Recommend salon pas patches for PHN facial pain.   - Imaging: Ordered X-ray and MRI of lumbar spine as he  failed conservative Tx with PT. US abdomen to assess hernia.   - Labs: Reviewed.  Current GFR = 39 based off of Cockcroft-Gault equation. Medications are appropriately dosed for current hepatorenal function.    Follow Up: RTC 4-6 weeks after imaging and trial of medications.     Sera Davis M.D.  Interventional Pain Medicine / Physical Medicine & Rehabilitation    Disclaimer: This note was partly generated using dictation software which may occasionally result in transcription errors.

## 2020-03-10 NOTE — PATIENT INSTRUCTIONS
Recommend salon pas with lidocaine patch to try over the painful area on his head. Be very careful not to get this into his eye.

## 2020-03-10 NOTE — LETTER
March 10, 2020      Karel Matthew, DO  [address] [facility] [address]           [facility]  [address] [place] [address] [address]  [facility] [address]  Phone: [phone]          Patient: Joann Adrian   MR Number: [id number]   YOB: 1945   Date of Visit: 3/10/2020       Dear Dr. Karel Matthew:    Thank you for referring Joann Adrian to me for evaluation. Attached you will find relevant portions of my assessment and plan of care.    If you have questions, please do not hesitate to call me. I look forward to following Joann Adrian along with you.    Sincerely,    Sera Davis MD    Enclosure  CC:  No Recipients    If you would like to receive this communication electronically, please contact [email or web address] or [phone] to request more information on [employer] Link access.    For providers and/or their staff who would like to refer a patient to [facility], please contact us through our one-stop-shop provider referral line, [facility] , at [phone].    If you feel you have received this communication in error or would no longer like to receive these types of communications, please e-mail [email or web address]

## 2020-03-12 ENCOUNTER — OFFICE VISIT (OUTPATIENT)
Dept: NEPHROLOGY | Facility: CLINIC | Age: 75
End: 2020-03-12
Payer: COMMERCIAL

## 2020-03-12 VITALS
OXYGEN SATURATION: 99 % | HEIGHT: 66 IN | HEART RATE: 84 BPM | DIASTOLIC BLOOD PRESSURE: 80 MMHG | SYSTOLIC BLOOD PRESSURE: 138 MMHG | WEIGHT: 183.44 LBS | BODY MASS INDEX: 29.48 KG/M2

## 2020-03-12 DIAGNOSIS — R80.9 POSITIVE FOR MACROALBUMINURIA: ICD-10-CM

## 2020-03-12 DIAGNOSIS — N18.30 CKD (CHRONIC KIDNEY DISEASE) STAGE 3, GFR 30-59 ML/MIN: Primary | ICD-10-CM

## 2020-03-12 DIAGNOSIS — R10.9 FLANK PAIN: ICD-10-CM

## 2020-03-12 DIAGNOSIS — R80.9 PROTEINURIA, UNSPECIFIED TYPE: ICD-10-CM

## 2020-03-12 DIAGNOSIS — R31.9 HEMATURIA SYNDROME: ICD-10-CM

## 2020-03-12 PROCEDURE — 99999 PR PBB SHADOW E&M-EST. PATIENT-LVL IV: CPT | Mod: PBBFAC,,, | Performed by: NURSE PRACTITIONER

## 2020-03-12 PROCEDURE — 99999 PR PBB SHADOW E&M-EST. PATIENT-LVL IV: ICD-10-PCS | Mod: PBBFAC,,, | Performed by: NURSE PRACTITIONER

## 2020-03-12 PROCEDURE — 1125F PR PAIN SEVERITY QUANTIFIED, PAIN PRESENT: ICD-10-PCS | Mod: S$GLB,,, | Performed by: NURSE PRACTITIONER

## 2020-03-12 PROCEDURE — 3075F SYST BP GE 130 - 139MM HG: CPT | Mod: CPTII,S$GLB,, | Performed by: NURSE PRACTITIONER

## 2020-03-12 PROCEDURE — 99204 OFFICE O/P NEW MOD 45 MIN: CPT | Mod: S$GLB,,, | Performed by: NURSE PRACTITIONER

## 2020-03-12 PROCEDURE — 1101F PT FALLS ASSESS-DOCD LE1/YR: CPT | Mod: CPTII,S$GLB,, | Performed by: NURSE PRACTITIONER

## 2020-03-12 PROCEDURE — 99204 PR OFFICE/OUTPT VISIT, NEW, LEVL IV, 45-59 MIN: ICD-10-PCS | Mod: S$GLB,,, | Performed by: NURSE PRACTITIONER

## 2020-03-12 PROCEDURE — 3079F DIAST BP 80-89 MM HG: CPT | Mod: CPTII,S$GLB,, | Performed by: NURSE PRACTITIONER

## 2020-03-12 PROCEDURE — 3079F PR MOST RECENT DIASTOLIC BLOOD PRESSURE 80-89 MM HG: ICD-10-PCS | Mod: CPTII,S$GLB,, | Performed by: NURSE PRACTITIONER

## 2020-03-12 PROCEDURE — 3075F PR MOST RECENT SYSTOLIC BLOOD PRESS GE 130-139MM HG: ICD-10-PCS | Mod: CPTII,S$GLB,, | Performed by: NURSE PRACTITIONER

## 2020-03-12 PROCEDURE — 1159F PR MEDICATION LIST DOCUMENTED IN MEDICAL RECORD: ICD-10-PCS | Mod: S$GLB,,, | Performed by: NURSE PRACTITIONER

## 2020-03-12 PROCEDURE — 1101F PR PT FALLS ASSESS DOC 0-1 FALLS W/OUT INJ PAST YR: ICD-10-PCS | Mod: CPTII,S$GLB,, | Performed by: NURSE PRACTITIONER

## 2020-03-12 PROCEDURE — 1159F MED LIST DOCD IN RCRD: CPT | Mod: S$GLB,,, | Performed by: NURSE PRACTITIONER

## 2020-03-12 PROCEDURE — 1125F AMNT PAIN NOTED PAIN PRSNT: CPT | Mod: S$GLB,,, | Performed by: NURSE PRACTITIONER

## 2020-03-12 RX ORDER — MULTIVITAMIN
1 TABLET ORAL DAILY
COMMUNITY

## 2020-03-12 NOTE — LETTER
March 12, 2020      Karel Matthew, DO  [address] [facility]  [place] [address]           [facility]  [address]  [place] [address]  Phone: [phone]  Fax: [phone]          Patient: Joann Adrian   MR Number: [id number]   YOB: 1945   Date of Visit: 3/12/2020       Dear Dr. Karel Matthew:    Thank you for referring Joann Adrian to me for evaluation. Attached you will find relevant portions of my assessment and plan of care.    If you have questions, please do not hesitate to call me. I look forward to following Joann Adrian along with you.    Sincerely,    Brenda Lopez, BHAVYA    Enclosure  CC:  No Recipients    If you would like to receive this communication electronically, please contact [email or web address] or [phone] to request more information on [employer] Link access.    For providers and/or their staff who would like to refer a patient to [facility], please contact us through our one-stop-shop provider referral line, [facility], at [phone].    If you feel you have received this communication in error or would no longer like to receive these types of communications, please e-mail [email or web address]

## 2020-03-12 NOTE — PATIENT INSTRUCTIONS
Get kidney ultrasound.  Continue low sodium diet.  Avoid NSAID (ibuprofen, advil, motrin, aleve, naprosyn, naproxen, Stanback, Goody's Powder). Tylenol is okay.  Avoid bactrim/ IV contrast/ gadolinium/ aminoglycoside where possible.  Avoid herbal supplements.  Stay hydrated.  Return to clinic in 3 months with labs (bloodwork and urine) or sooner if needed.  Go to the ER with any emergency concerns.      Chronic Kidney Disease (CKD)     The role of the kidneys is to remove waste products and extra water from the blood.  When the kidneys do not work as they should, waste products begin to build up in the blood. This is called chronic kidney disease (CKD). CKD means that you have kidney damage or a decrease in kidney function lasting at least 3 months. CKD allows extra water, waste, and toxins to build up in the body. This can eventually become life-threatening. You might need dialysis or a kidney transplant to stay alive. This most severe form is called end stage renal disease.  Diabetes is the leading causes of chronic renal failure. Other causes include high blood pressure, hardening of the arteries (atherosclerosis), lupus, inflammation of the blood vessels (vasculitis), and past viral or bacterial infections. Certain over-the-counter pain medicines can cause renal failure when taken often over a long period of time. These include aspirin, ibuprofen, and related anti-inflammatory medicines called NSAIDs (nonsteroidal anti-inflammatory drugs).  Home care  The following guidelines will help you care for yourself at home:  · If you have diabetes, talk with your healthcare provider about keeping your blood sugar under control. Ask if you need to make and changes to your diet, lifestyle, or medicines.  · If you have high blood pressure:  ¨ Take prescribed medicine to lower your blood pressure to the recommended goal of less than 130/80.  ¨ Start a regular exercise program that you enjoy. Check with your healthcare  provider to be sure your planned exercise program is right for you.  ¨ Eat less salt (sodium). Your healthcare provider can tell you how much salt per day is safe for you.  · If you are overweight, talk with your healthcare provider about a weight loss plan.  · If you smoke, you must quit. Smoking makes kidney disease worse. Talk with your healthcare provider about ways to help you quit.  For more information, visit the following links:  ¨ www.Linkuriousee.gov/sites/default/files/pdf/clearing-the-air-accessible.pdf  ¨ www.smokefree.gov  ¨ www.cancer.org/healthy/stayawayfromtobacco/guidetoquittingsmoking/  · Most people with CKD need to follow a special diet.  Be sure you understand yours. In general, you will need to limit protein, salt, potassium, and phosphorus. You also need to limit how much fluid you drink.   · CKD is a risk factor for heart disease. Talk with your healthcare provider about any other risk factors you might have and what you can do to lessen them.  · Talk with your healthcare provider about any medicines you are taking to find out if they need to be reduced or stopped.  · Don't use the following over-the-counter medicines, or consult your healthcare provider before using:  ¨ Aspirin and NSAIDs such as ibuprofen or naproxen. Using acetaminophen for fever or pain is OK.  ¨ Laxatives and antacids containing magnesium or aluminum  ¨ Fleet or phospho soda enemas containing phosphorus  ¨ Certain stomach acid-blocking medicine such as cimetidine or ranitidine   ¨ Decongestants containing pseudoephedrine   ¨ Herbal supplements  Follow-up care  Follow up with your healthcare provider, or as advised. Contact one of the following for more information:  · American Association of Kidney Patients 842-270-0876 www.aakp.org  · National Kidney Foundation 721-642-7736 www.kidney.org  · American Kidney Fund 644-105-2144 www.kidneyfund.org  · National Kidney Disease Education Program 866-4KIDNEY www.nkdep.nih.gov  If  an X-ray, ECG (cardiogram), or other diagnostic test was taken, you will be told of any new findings that may affect your care.  Call 911  Call 911 if you have any of the following:  · Severe weakness, dizziness, fainting, drowsiness, or confusion  · Chest pain or shortness of breath  · Heart beating fast, slow, or irregularly  When to seek medical advice  Call your healthcare provider right away if any of these occur:  · Nausea or vomiting  · Fever of 100.4°F (38°C) or higher, or as directed by your healthcare provider  · Unexpected weight gain or swelling in the legs, ankles, or around the eyes  · Decrease or absent urine output  Date Last Reviewed: 9/1/2016  © 1665-2321 [employer]. [address] [place] [address], [place] [address]. All rights reserved. This information is not intended as a substitute for professional medical care. Always follow your healthcare professional's instructions.

## 2020-03-13 ENCOUNTER — HOSPITAL ENCOUNTER (OUTPATIENT)
Dept: RADIOLOGY | Facility: HOSPITAL | Age: 75
Discharge: HOME | End: 2020-03-13
Attending: NURSE PRACTITIONER
Payer: COMMERCIAL

## 2020-03-13 ENCOUNTER — LAB VISIT (OUTPATIENT)
Dept: LAB | Facility: HOSPITAL | Age: 75
End: 2020-03-13
Attending: NURSE PRACTITIONER
Payer: COMMERCIAL

## 2020-03-13 ENCOUNTER — PATIENT MESSAGE (OUTPATIENT)
Dept: NEPHROLOGY | Facility: CLINIC | Age: 75
End: 2020-03-13

## 2020-03-13 DIAGNOSIS — N18.30 CKD (CHRONIC KIDNEY DISEASE) STAGE 3, GFR 30-59 ML/MIN: ICD-10-CM

## 2020-03-13 LAB
ALBUMIN SERPL BCP-MCNC: 3.5 G/DL (ref 3.5–5.2)
ANION GAP SERPL CALC-SCNC: 9 MMOL/L (ref 8–16)
BUN SERPL-MCNC: 23 MG/DL (ref 8–23)
CALCIUM SERPL-MCNC: 9.7 MG/DL (ref 8.7–10.5)
CHLORIDE SERPL-SCNC: 102 MMOL/L (ref 95–110)
CO2 SERPL-SCNC: 24 MMOL/L (ref 23–29)
CREAT SERPL-MCNC: 1.9 MG/DL (ref 0.5–1.4)
EST. GFR  (AFRICAN AMERICAN): 40 ML/MIN/1.73 M^2
EST. GFR  (NON AFRICAN AMERICAN): 34 ML/MIN/1.73 M^2
GLUCOSE SERPL-MCNC: 303 MG/DL (ref 70–110)
PHOSPHATE SERPL-MCNC: 4.5 MG/DL (ref 2.7–4.5)
POTASSIUM SERPL-SCNC: 4.7 MMOL/L (ref 3.5–5.1)
SODIUM SERPL-SCNC: 135 MMOL/L (ref 136–145)

## 2020-03-13 PROCEDURE — 76770 US EXAM ABDO BACK WALL COMP: CPT | Mod: 26,,, | Performed by: RADIOLOGY

## 2020-03-13 PROCEDURE — 80069 RENAL FUNCTION PANEL: CPT

## 2020-03-13 PROCEDURE — 76770 US RETROPERITONEAL COMPLETE: ICD-10-PCS | Mod: 26,,, | Performed by: RADIOLOGY

## 2020-03-13 PROCEDURE — 36415 COLL VENOUS BLD VENIPUNCTURE: CPT

## 2020-03-13 PROCEDURE — 76770 US EXAM ABDO BACK WALL COMP: CPT | Mod: TC

## 2020-03-14 ENCOUNTER — PATIENT MESSAGE (OUTPATIENT)
Dept: FAMILY MEDICINE | Facility: CLINIC | Age: 75
End: 2020-03-14

## 2020-03-16 ENCOUNTER — PATIENT MESSAGE (OUTPATIENT)
Dept: NEPHROLOGY | Facility: CLINIC | Age: 75
End: 2020-03-16

## 2020-03-16 ENCOUNTER — TELEPHONE (OUTPATIENT)
Dept: NEPHROLOGY | Facility: CLINIC | Age: 75
End: 2020-03-16

## 2020-03-16 NOTE — TELEPHONE ENCOUNTER
----- Message from Liliana Holbrook MD sent at 3/16/2020  4:16 PM CDT -----  Yes. Will take care of it.    ----- Message -----  From: Brenda Lopez NP  Sent: 3/16/2020   4:12 PM CDT  To: Liliana Holbrook MD    Hello Dr. Holbrook,    You will be seeing this pt as a new consult for hematuria in a week or so.  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 Can I defer to you to look at these and determine if any interventions are necessary?    Thanks,  JERMAINE Tiwari

## 2020-03-18 ENCOUNTER — HOSPITAL ENCOUNTER (OUTPATIENT)
Dept: RADIOLOGY | Facility: HOSPITAL | Age: 75
Discharge: HOME | End: 2020-03-18
Attending: PHYSICAL MEDICINE & REHABILITATION
Payer: COMMERCIAL

## 2020-03-18 DIAGNOSIS — M54.41 CHRONIC RIGHT-SIDED LOW BACK PAIN WITH RIGHT-SIDED SCIATICA: ICD-10-CM

## 2020-03-18 DIAGNOSIS — M54.10 RADICULAR PAIN OF RIGHT LOWER EXTREMITY: ICD-10-CM

## 2020-03-18 DIAGNOSIS — G89.29 CHRONIC RIGHT-SIDED LOW BACK PAIN WITH RIGHT-SIDED SCIATICA: ICD-10-CM

## 2020-03-18 PROCEDURE — 72148 MRI LUMBAR SPINE W/O DYE: CPT | Mod: 26,,, | Performed by: RADIOLOGY

## 2020-03-18 PROCEDURE — 72148 MRI LUMBAR SPINE WITHOUT CONTRAST: ICD-10-PCS | Mod: 26,,, | Performed by: RADIOLOGY

## 2020-03-18 PROCEDURE — 72148 MRI LUMBAR SPINE W/O DYE: CPT | Mod: TC

## 2020-03-21 ENCOUNTER — PATIENT MESSAGE (OUTPATIENT)
Dept: PAIN MEDICINE | Facility: CLINIC | Age: 75
End: 2020-03-21

## 2020-03-23 ENCOUNTER — TELEPHONE (OUTPATIENT)
Dept: UROLOGY | Facility: CLINIC | Age: 75
End: 2020-03-23

## 2020-03-23 NOTE — TELEPHONE ENCOUNTER
Joann Adrian was contacted via phone to discuss their upcoming appointment with [facility] on 03/30/2020.  Due to the COVID-19 / coronavirus concerns, [facility] administration as well as the [facility] have asked all providers to review upcoming clinic visits and stratify those with urgent/emergent indications. The [facility] providers have reviewed the patient's chart and made a plan for rescheduling to a later date. The patient understands that this is a fluid and ongoing situation. Their visit dates may change based off of the recommendations from [facility] administration or the [place] [facility]. Should your symptoms or condition worsen, please contact the office or go to the Er/ urgent care if after clinic hours.

## 2020-03-24 ENCOUNTER — HOSPITAL ENCOUNTER (OUTPATIENT)
Dept: RADIOLOGY | Facility: HOSPITAL | Age: 75
Discharge: HOME | End: 2020-03-24
Attending: PHYSICAL MEDICINE & REHABILITATION
Payer: COMMERCIAL

## 2020-03-24 ENCOUNTER — DOCUMENTATION ONLY (OUTPATIENT)
Dept: PAIN MEDICINE | Facility: CLINIC | Age: 75
End: 2020-03-24

## 2020-03-24 DIAGNOSIS — K43.9 VENTRAL HERNIA WITHOUT OBSTRUCTION OR GANGRENE: ICD-10-CM

## 2020-03-24 PROCEDURE — 76705 US ABDOMEN LIMITED_HERNIA: ICD-10-PCS | Mod: 26,,, | Performed by: RADIOLOGY

## 2020-03-24 PROCEDURE — 76705 ECHO EXAM OF ABDOMEN: CPT | Mod: TC

## 2020-03-24 PROCEDURE — 76705 ECHO EXAM OF ABDOMEN: CPT | Mod: 26,,, | Performed by: RADIOLOGY

## 2020-03-24 NOTE — PROGRESS NOTES
Discussed ultrasound findings with radiology. Midline structures not assessed, as patient was only noting pain in the LUQ. At this point, I will hold off on repeating abdom U/S as I am not sure the midline hernia is the cause of the pain. We can also consider repeat CT abd.

## 2020-03-26 ENCOUNTER — PATIENT MESSAGE (OUTPATIENT)
Dept: PAIN MEDICINE | Facility: CLINIC | Age: 75
End: 2020-03-26

## 2020-04-06 ENCOUNTER — PATIENT OUTREACH (OUTPATIENT)
Dept: ADMINISTRATIVE | Facility: OTHER | Age: 75
End: 2020-04-06

## 2020-04-07 ENCOUNTER — OFFICE VISIT (OUTPATIENT)
Dept: PAIN MEDICINE | Facility: CLINIC | Age: 75
End: 2020-04-07
Payer: COMMERCIAL

## 2020-04-07 DIAGNOSIS — R10.12 LEFT UPPER QUADRANT PAIN: ICD-10-CM

## 2020-04-07 DIAGNOSIS — K43.9 VENTRAL HERNIA WITHOUT OBSTRUCTION OR GANGRENE: ICD-10-CM

## 2020-04-07 DIAGNOSIS — M54.31 SCIATICA OF RIGHT SIDE: ICD-10-CM

## 2020-04-07 DIAGNOSIS — M54.41 CHRONIC RIGHT-SIDED LOW BACK PAIN WITH RIGHT-SIDED SCIATICA: ICD-10-CM

## 2020-04-07 DIAGNOSIS — G89.29 CHRONIC RIGHT-SIDED LOW BACK PAIN WITH RIGHT-SIDED SCIATICA: ICD-10-CM

## 2020-04-07 DIAGNOSIS — N18.30 CKD (CHRONIC KIDNEY DISEASE) STAGE 3, GFR 30-59 ML/MIN: Primary | ICD-10-CM

## 2020-04-07 DIAGNOSIS — M54.16 LUMBAR RADICULOPATHY: ICD-10-CM

## 2020-04-07 DIAGNOSIS — M79.2 NEUROPATHIC PAIN: ICD-10-CM

## 2020-04-07 DIAGNOSIS — B02.29 POSTHERPETIC NEURALGIA: ICD-10-CM

## 2020-04-07 PROCEDURE — 1101F PR PT FALLS ASSESS DOC 0-1 FALLS W/OUT INJ PAST YR: ICD-10-PCS | Mod: CPTII,,, | Performed by: PHYSICAL MEDICINE & REHABILITATION

## 2020-04-07 PROCEDURE — 99214 OFFICE O/P EST MOD 30 MIN: CPT | Mod: 95,,, | Performed by: PHYSICAL MEDICINE & REHABILITATION

## 2020-04-07 PROCEDURE — 99214 PR OFFICE/OUTPT VISIT, EST, LEVL IV, 30-39 MIN: ICD-10-PCS | Mod: 95,,, | Performed by: PHYSICAL MEDICINE & REHABILITATION

## 2020-04-07 PROCEDURE — 1159F MED LIST DOCD IN RCRD: CPT | Mod: ,,, | Performed by: PHYSICAL MEDICINE & REHABILITATION

## 2020-04-07 PROCEDURE — 1159F PR MEDICATION LIST DOCUMENTED IN MEDICAL RECORD: ICD-10-PCS | Mod: ,,, | Performed by: PHYSICAL MEDICINE & REHABILITATION

## 2020-04-07 PROCEDURE — 1101F PT FALLS ASSESS-DOCD LE1/YR: CPT | Mod: CPTII,,, | Performed by: PHYSICAL MEDICINE & REHABILITATION

## 2020-04-07 RX ORDER — GABAPENTIN 300 MG/1
300 CAPSULE ORAL NIGHTLY
Qty: 30 CAPSULE | Refills: 11 | Status: SHIPPED | OUTPATIENT
Start: 2020-04-07 | End: 2020-06-16 | Stop reason: SDUPTHER

## 2020-04-07 NOTE — PROGRESS NOTES
Pain Medicine   Telemedicine Virtual Visit    The patient location is: Home in [place]  The chief complaint leading to consultation is: f/u abdominal pain, back pain, facial pain  Visit type: Virtual visit with synchronous audio and video  Total time spent with patient: 14 mins  Each patient to whom he or she provides medical services by telemedicine is:  (1) informed of the relationship between the physician and patient and the respective role of any other health care provider with respect to management of the patient; and (2) notified that he or she may decline to receive medical services by telemedicine and may withdraw from such care at any time.    Notes: Joann Adrian is scheduled for follow-up visit, but due to current Corona Virus Pandemic and goal of limiting patient and community exposure, a virtual visit is being performed in an attempt to limit risks of viral transmission.       Chief Complaint:   Chief Complaint   Patient presents with    Facial Pain    Low-back Pain    Abdominal Pain       History of Present Illness: Joann Adrian is a 74 y.o. male referred by No ref. provider found for LUQ abdominal pain.      He also presents with right sided face/head pain, and right hip/leg pain. Head/facial pain is his worst pain.     Onset: Shingles on right side of head 2 years ago, with persisting pain in that area. Two months of LUQ abdominal pain. Right hip and right SIJ pain laterally over GTB started 3 months ago. No clear inciting incident  Location: Right side of face above eye (V1 distribution). LUQ abdominal. Right lateral hip and SIJ.   Radiation: Right side of face radiates to back of head. LUQ abdominal pain radiates into lower quadrant on the left. Right hip pain radiates down the right leg.   Timing: constant  Quality: Face is burning. LUQ is burning, Aching and Dull. Right hip/leg pain feels aching  Exacerbating Factors: Touching the face. nothing in particular aggravates the abdomen or  right hip/leg pain.   Alleviating Factors: medications  Associated Symptoms: denies night fever/night sweats, urinary incontinence, bowel incontinence, significant weight loss, significant motor weakness and loss of sensations    Severity: Currently: 6 /10   Typical Range: 1-8/10     Exacerbation: 7/10     He previously saw Dr. Eagle for pain.     Interval History (04/07/2020):  Joann Adrian returns today for follow up.  At the last clinic visit, encouraged daily light exercise, considered abdominal TPI after abdominal US, increased gabapentin to 600 mg at night (goal will be 600 mg BID), recommended salon pas patches for PHN facial pain, ordered x-ray and MRI of lumbar spine.    Currently, the face, back, and abdominal pain is stable.      The increased gabapentin made him dizzy. They did not try salon pas patches.     The facial pain is the most bothersome. No changes in location or character.     Abdominal pain is the second most bothersome. Denies changes in bowels, N/V. He does get constipated at times. Pain goes from the ribs down to the LLQ.     No change in the location or quality of the pain since the most recent visit is reported.  No significant interval events or traumas. No change in bowel or bladder function, & no new weakness or numbness is reported. No new musculoskeletal pain complaints.           Previous Interventions:  - None    Previous Therapies:  PT/OT: yes, doesn't feel like it is helping  Surgery: no surgery of spine or abdomen  Previous Medications:   - NSAIDS: Tylenol. Advil helps some  - Muscle Relaxants:    - TCAs:   - SNRIs:   - Topicals:   - Anticonvulsants:  Lyrica didn't help.   - Opioids: Took something from Dr. Bolivar which caused sedation    Current Pain Medications:  1. Tylenol  2. Gabapentin 400 mg every evening     Blood Thinners: ASA 81 mg     Full Medication List:    Current Outpatient Medications:     amLODIPine (NORVASC) 10 MG tablet, Take 1 tablet (10 mg total) by  mouth once daily., Disp: 90 tablet, Rfl: 1    aspirin 81 MG Chew, Take 1 tablet (81 mg total) by mouth once daily. (Patient not taking: Reported on 3/12/2020), Disp: , Rfl:     atorvastatin (LIPITOR) 40 MG tablet, Take 1 tablet (40 mg total) by mouth once daily., Disp: 90 tablet, Rfl: 3    bisoprolol (ZEBETA) 5 MG tablet, Take 1 tablet (5 mg total) by mouth once daily., Disp: 90 tablet, Rfl: 0    gabapentin (NEURONTIN) 300 MG capsule, Take 1 capsule (300 mg total) by mouth every evening., Disp: 30 capsule, Rfl: 11    glimepiride (AMARYL) 4 MG tablet, Take 1 tablet (4 mg total) by mouth daily with breakfast., Disp: 90 tablet, Rfl: 0    metFORMIN (GLUCOPHAGE) 1000 MG tablet, Take 1 tablet (1,000 mg total) by mouth 2 (two) times daily with meals., Disp: 180 tablet, Rfl: 0    multivitamin (ONE DAILY MULTIVITAMIN) per tablet, Take 1 tablet by mouth once daily., Disp: , Rfl:     omeprazole (PRILOSEC) 40 MG capsule, Take 1 capsule (40 mg total) by mouth once daily., Disp: 90 capsule, Rfl: 0    OPW TEST CLAIM - DO NOT FILL, OPW test claim. Do not fill., Disp: 28 mL, Rfl: 0    valsartan (DIOVAN) 160 MG tablet, Take 1 tablet (160 mg total) by mouth once daily., Disp: 90 tablet, Rfl: 0     Review of Systems:  Review of Systems   Constitutional: Negative for fever and weight loss.   HENT: Negative for ear pain and tinnitus.    Eyes: Positive for pain and redness.   Respiratory: Negative for cough and shortness of breath.    Cardiovascular: Negative for chest pain and palpitations.   Gastrointestinal: Positive for abdominal pain and constipation. Negative for heartburn.   Genitourinary: Positive for frequency.        Denies urinary incontinence. Denies urine retention.    Musculoskeletal: Positive for back pain. Negative for neck pain.   Skin: Negative for itching and rash.   Neurological: Positive for dizziness, weakness and headaches. Negative for tingling and seizures.   Endo/Heme/Allergies: Negative for  environmental allergies. Does not bruise/bleed easily.   Psychiatric/Behavioral: Negative for depression. The patient has insomnia. The patient is not nervous/anxious.        Allergies:  Patient has no known allergies.     Medical History:   has a past medical history of Cataract, Coronary artery disease, Diabetes mellitus, type 2, Hyperlipidemia, and Hypertension.    Surgical History:   has a past surgical history that includes Coronary artery bypass graft; Cataract extraction, bilateral; and Cataract extraction.    Family History:  family history includes Liver disease in his sister.    Social History:   reports that he has never smoked. He has never used smokeless tobacco. He reports that he drank alcohol. He reports that he has current or past drug history.    Physical Exam:  There were no vitals taken for this visit.  GEN: No acute distress. Calm, comfortable  HENT: Normocephalic, atraumatic, moist mucous membranes  EYE: Anicteric sclera, non-injected  CV: Non-diaphoretic.  CHEST: Breathing comfortably. Chest expansion symmetric  EXT: No clubbing, cyanosis.   Psych: Mood and affect are appropriate  GAIT: Independent, normal ambulation      Imaging:  - MRI Lumbar Spine 3/18/20:  Grade 1 anterolisthesis L5 on S1 secondary to facet joint arthropathy.  There is scattered mild endplate degenerative change with degenerative disc disease most pronounced at L5/S1 with mild height loss.  The distal spinal cord and conus is normal in signal and contour tip of the conus approximates the inferior L1 level.  No aneurysmal dilatation of the visualized abdominal aorta.  Scattered partially visualized prominent foci periaortic and right retrocaval retroperitoneum suggestive for prominent retroperitoneal lymph nodes, largest measuring 11 mm in short access.  T12/L1 through L1/L2: No significant disc bulge, central canal or neural foraminal stenosis.  L2/L3: Bulging disc without significant central canal stenosis.  There is a  joint arthropathy and mild bilateral neural foraminal stenosis.  L3/L4:No significant disc bulge or central canal stenosis.  There is facet joint arthropathy and mild bilateral foraminal stenosis.  L4/L5:Bulging disc with ligamentum flavum hypertrophy and facet joint arthropathy and superimposed epidural lipomatosis dorsally with moderate resulting central canal stenosis and bilateral neural foraminal stenosis.  L5/S1: Grade 1 anterolisthesis the with posterior disc osteophyte and ligamentum flavum hypertrophy and facet joint arthropathy without significant central canal stenosis with moderate to severe bilateral neural foraminal stenosis    - X-ray Lumbar Spine 3/10/20:  Vertebral bodies are intact.  L5-S1 disc space is mildly narrowed.  Mild degenerative changes are seen around the facet joints.  No collapse or destruction is noted.  No instability in flexion and extension    - CT Abd Pelv w/ contrast 1/9/20:  1. No acute abnormalities in the abdomen or pelvis.  2. Right lower lobe pulmonary nodule measuring 0.9 cm.  Per Fleischner Society 2017 guidelines, recommend follow-up CT chest in 3 months.  3. Many borderline enlarged retroperitoneal nodes and a mildly enlarged portacaval lymph node.  These are nonspecific.  Consider short-term follow-up in 3 months.  4. Punctate calcifications probably within a gastrohepatic lymph node and adjacent to the esophagus in the lower mediastinum suggests prior granulomatous disease.  5. Cholelithiasis.    Labs:  BMP  Lab Results   Component Value Date     (L) 03/13/2020    K 4.7 03/13/2020     03/13/2020    CO2 24 03/13/2020    BUN 23 03/13/2020    CREATININE 1.9 (H) 03/13/2020    CALCIUM 9.7 03/13/2020    ANIONGAP 9 03/13/2020    ESTGFRAFRICA 40 (A) 03/13/2020    EGFRNONAA 34 (A) 03/13/2020     Lab Results   Component Value Date    ALT 32 02/11/2020    AST 30 02/11/2020    ALKPHOS 82 02/11/2020    BILITOT 0.5 02/11/2020     Lab Results   Component Value Date    PLT  200 02/11/2020     Lab Results   Component Value Date    LABA1C 9.0 (A) 11/12/2019    HGBA1C 8.1 (H) 02/11/2020     Assessment:  Joann Adrian is a 74 y.o. male with the following diagnoses based on history, exam, and imaging:    Problem List Items Addressed This Visit        Renal/    CKD (chronic kidney disease) stage 3, GFR 30-59 ml/min - Primary      Other Visit Diagnoses     Postherpetic neuralgia        Neuropathic pain        Left upper quadrant pain        Ventral hernia without obstruction or gangrene        Chronic right-sided low back pain with right-sided sciatica        Lumbar radiculopathy        Sciatica of right side        Relevant Medications    gabapentin (NEURONTIN) 300 MG capsule          This is a pleasant 74 y.o. gentleman with CAD, hypertension, CKD, DM2, GERD,     Right Head/Facial Pain: Appears to be due to postherpetic neuralgia with history of shingles in the area.    Abdominal Pain: He does have a ventral hernia on exam. He also has TTP in LUQ and not around the hernia, which may be myofascial, as it does not appear visceral in nature. Abd US did not assess midline, so may repeat in future.     Back pain appears multifactorial. 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     Treatment Plan:   - PT/OT/HEP: Patient just finished PT. Encouraged daily light aerobic exercise  - Procedures:   - Consider abdominal TPI at next visit  - Consider right L4 & L5 TF KELSIE in the future  - Medications:   - Not able to tolerate higher dose of gabapentin so back at 300 mg qHS.   - Recommend salon pas patches for PHN facial pain.   - Imaging: Reviewed. May repeat ABD US to assess ventral hernia in the future  - Labs: Reviewed.  Current GFR = 39 based off of Cockcroft-Gault equation. Medications are appropriately dosed for current hepatorenal function.    Follow Up: RTC in Late May/June    Sera Davis M.D.  Interventional Pain  Medicine / Physical Medicine & Rehabilitation    Disclaimer: This note was partly generated using dictation software which may occasionally result in transcription errors.

## 2020-04-21 ENCOUNTER — PATIENT OUTREACH (OUTPATIENT)
Dept: ADMINISTRATIVE | Facility: OTHER | Age: 75
End: 2020-04-21

## 2020-04-23 ENCOUNTER — TELEPHONE (OUTPATIENT)
Dept: FAMILY MEDICINE | Facility: CLINIC | Age: 75
End: 2020-04-23

## 2020-04-23 ENCOUNTER — OFFICE VISIT (OUTPATIENT)
Dept: UROLOGY | Facility: CLINIC | Age: 75
End: 2020-04-23
Payer: COMMERCIAL

## 2020-04-23 VITALS
BODY MASS INDEX: 28.53 KG/M2 | WEIGHT: 177.5 LBS | DIASTOLIC BLOOD PRESSURE: 91 MMHG | SYSTOLIC BLOOD PRESSURE: 158 MMHG | HEIGHT: 66 IN | TEMPERATURE: 98 F | HEART RATE: 80 BPM

## 2020-04-23 DIAGNOSIS — R31.29 MICROSCOPIC HEMATURIA: Primary | ICD-10-CM

## 2020-04-23 DIAGNOSIS — R91.1 INCIDENTAL LUNG NODULE, GREATER THAN OR EQUAL TO 8MM: Primary | ICD-10-CM

## 2020-04-23 DIAGNOSIS — R59.9 ENLARGED LYMPH NODES: ICD-10-CM

## 2020-04-23 DIAGNOSIS — N18.30 CKD (CHRONIC KIDNEY DISEASE) STAGE 3, GFR 30-59 ML/MIN: ICD-10-CM

## 2020-04-23 DIAGNOSIS — R31.21 ASYMPTOMATIC MICROSCOPIC HEMATURIA: ICD-10-CM

## 2020-04-23 DIAGNOSIS — N20.0 KIDNEY STONES: ICD-10-CM

## 2020-04-23 DIAGNOSIS — R31.9 HEMATURIA SYNDROME: ICD-10-CM

## 2020-04-23 LAB
BILIRUB SERPL-MCNC: ABNORMAL MG/DL
BLOOD URINE, POC: ABNORMAL
COLOR, POC UA: YELLOW
GLUCOSE UR QL STRIP: 250
KETONES UR QL STRIP: ABNORMAL
LEUKOCYTE ESTERASE URINE, POC: ABNORMAL
NITRITE, POC UA: ABNORMAL
PH, POC UA: 5
POC RESIDUAL URINE VOLUME: 3 ML (ref 0–100)
PROTEIN, POC: ABNORMAL
SPECIFIC GRAVITY, POC UA: 1.02
UROBILINOGEN, POC UA: ABNORMAL

## 2020-04-23 PROCEDURE — 99999 PR PBB SHADOW E&M-EST. PATIENT-LVL IV: ICD-10-PCS | Mod: PBBFAC,,, | Performed by: STUDENT IN AN ORGANIZED HEALTH CARE EDUCATION/TRAINING PROGRAM

## 2020-04-23 PROCEDURE — 81002 PR URINALYSIS NONAUTO W/O SCOPE: ICD-10-PCS | Mod: S$GLB,,, | Performed by: STUDENT IN AN ORGANIZED HEALTH CARE EDUCATION/TRAINING PROGRAM

## 2020-04-23 PROCEDURE — 3080F DIAST BP >= 90 MM HG: CPT | Mod: CPTII,S$GLB,, | Performed by: STUDENT IN AN ORGANIZED HEALTH CARE EDUCATION/TRAINING PROGRAM

## 2020-04-23 PROCEDURE — 99204 PR OFFICE/OUTPT VISIT, NEW, LEVL IV, 45-59 MIN: ICD-10-PCS | Mod: 25,S$GLB,, | Performed by: STUDENT IN AN ORGANIZED HEALTH CARE EDUCATION/TRAINING PROGRAM

## 2020-04-23 PROCEDURE — 3077F PR MOST RECENT SYSTOLIC BLOOD PRESSURE >= 140 MM HG: ICD-10-PCS | Mod: CPTII,S$GLB,, | Performed by: STUDENT IN AN ORGANIZED HEALTH CARE EDUCATION/TRAINING PROGRAM

## 2020-04-23 PROCEDURE — 1101F PT FALLS ASSESS-DOCD LE1/YR: CPT | Mod: CPTII,S$GLB,, | Performed by: STUDENT IN AN ORGANIZED HEALTH CARE EDUCATION/TRAINING PROGRAM

## 2020-04-23 PROCEDURE — 99999 PR PBB SHADOW E&M-EST. PATIENT-LVL IV: CPT | Mod: PBBFAC,,, | Performed by: STUDENT IN AN ORGANIZED HEALTH CARE EDUCATION/TRAINING PROGRAM

## 2020-04-23 PROCEDURE — 51736 PR SIMPLE UROFLOWMETRY: ICD-10-PCS | Mod: S$GLB,,, | Performed by: STUDENT IN AN ORGANIZED HEALTH CARE EDUCATION/TRAINING PROGRAM

## 2020-04-23 PROCEDURE — 1159F PR MEDICATION LIST DOCUMENTED IN MEDICAL RECORD: ICD-10-PCS | Mod: S$GLB,,, | Performed by: STUDENT IN AN ORGANIZED HEALTH CARE EDUCATION/TRAINING PROGRAM

## 2020-04-23 PROCEDURE — 99204 OFFICE O/P NEW MOD 45 MIN: CPT | Mod: 25,S$GLB,, | Performed by: STUDENT IN AN ORGANIZED HEALTH CARE EDUCATION/TRAINING PROGRAM

## 2020-04-23 PROCEDURE — 81002 URINALYSIS NONAUTO W/O SCOPE: CPT | Mod: S$GLB,,, | Performed by: STUDENT IN AN ORGANIZED HEALTH CARE EDUCATION/TRAINING PROGRAM

## 2020-04-23 PROCEDURE — 51798 PR MEAS,POST-VOID RES,US,NON-IMAGING: ICD-10-PCS | Mod: S$GLB,,, | Performed by: STUDENT IN AN ORGANIZED HEALTH CARE EDUCATION/TRAINING PROGRAM

## 2020-04-23 PROCEDURE — 1101F PR PT FALLS ASSESS DOC 0-1 FALLS W/OUT INJ PAST YR: ICD-10-PCS | Mod: CPTII,S$GLB,, | Performed by: STUDENT IN AN ORGANIZED HEALTH CARE EDUCATION/TRAINING PROGRAM

## 2020-04-23 PROCEDURE — 1159F MED LIST DOCD IN RCRD: CPT | Mod: S$GLB,,, | Performed by: STUDENT IN AN ORGANIZED HEALTH CARE EDUCATION/TRAINING PROGRAM

## 2020-04-23 PROCEDURE — 3080F PR MOST RECENT DIASTOLIC BLOOD PRESSURE >= 90 MM HG: ICD-10-PCS | Mod: CPTII,S$GLB,, | Performed by: STUDENT IN AN ORGANIZED HEALTH CARE EDUCATION/TRAINING PROGRAM

## 2020-04-23 PROCEDURE — 51798 US URINE CAPACITY MEASURE: CPT | Mod: S$GLB,,, | Performed by: STUDENT IN AN ORGANIZED HEALTH CARE EDUCATION/TRAINING PROGRAM

## 2020-04-23 PROCEDURE — 1126F PR PAIN SEVERITY QUANTIFIED, NO PAIN PRESENT: ICD-10-PCS | Mod: S$GLB,,, | Performed by: STUDENT IN AN ORGANIZED HEALTH CARE EDUCATION/TRAINING PROGRAM

## 2020-04-23 PROCEDURE — 51736 URINE FLOW MEASUREMENT: CPT | Mod: S$GLB,,, | Performed by: STUDENT IN AN ORGANIZED HEALTH CARE EDUCATION/TRAINING PROGRAM

## 2020-04-23 PROCEDURE — 1126F AMNT PAIN NOTED NONE PRSNT: CPT | Mod: S$GLB,,, | Performed by: STUDENT IN AN ORGANIZED HEALTH CARE EDUCATION/TRAINING PROGRAM

## 2020-04-23 PROCEDURE — 3077F SYST BP >= 140 MM HG: CPT | Mod: CPTII,S$GLB,, | Performed by: STUDENT IN AN ORGANIZED HEALTH CARE EDUCATION/TRAINING PROGRAM

## 2020-04-23 RX ORDER — AMITRIPTYLINE HYDROCHLORIDE 50 MG/1
TABLET, FILM COATED ORAL
COMMUNITY
Start: 2020-03-31 | End: 2020-06-16

## 2020-04-23 NOTE — TELEPHONE ENCOUNTER
Jessica, please call SON.    He needed a CT abdomen from me, but I can't order with IV contrast after discussion with his kidney doctor. I talked to his urologist today and she also needs a CT. We will coordinate our tests; I have ordered a CT with ORAL contrast. This should NOT affect his kidneys.     Please schedule this when available given covid pandemic. This should be most recent CT chest/abd/pelvis with oral contrast that I have ordered.     CC: this note and CT scan to Dr. Liliana Holbrook.

## 2020-04-23 NOTE — PROGRESS NOTES
"Subjective:       Patient ID: Joann Adrian is a 74 y.o. male.    Chief Complaint: No chief complaint on file.   ***  This is a 74 y.o.  male patient that is {Blank single:54693::"new to me.","new to me but not new to the system.","an established patient of mine."}  The patient is {Blank single:66548::"self referred","referred to me by"} *** for ***.     uroflow suboptimal low volume 35cc  PVR 3ml    His son is here to translate in person - Joann Harperdu     Lab Results   Component Value Date    CREATININE 1.9 (H) 03/13/2020      ---  Past Medical History:   Diagnosis Date    Cataract     Coronary artery disease     Diabetes mellitus, type 2     Hyperlipidemia     Hypertension        Past Surgical History:   Procedure Laterality Date    CATARACT EXTRACTION      CATARACT EXTRACTION, BILATERAL      CORONARY ARTERY BYPASS GRAFT      2003       Family History   Problem Relation Age of Onset    Liver disease Sister     Glaucoma Neg Hx     Macular degeneration Neg Hx        Social History     Tobacco Use    Smoking status: Never Smoker    Smokeless tobacco: Never Used   Substance Use Topics    Alcohol use: Not Currently    Drug use: Not Currently       Current Outpatient Medications on File Prior to Visit   Medication Sig Dispense Refill    amLODIPine (NORVASC) 10 MG tablet Take 1 tablet (10 mg total) by mouth once daily. 90 tablet 1    aspirin 81 MG Chew Take 1 tablet (81 mg total) by mouth once daily. (Patient not taking: Reported on 3/12/2020)      atorvastatin (LIPITOR) 40 MG tablet Take 1 tablet (40 mg total) by mouth once daily. 90 tablet 3    bisoprolol (ZEBETA) 5 MG tablet Take 1 tablet (5 mg total) by mouth once daily. 90 tablet 0    gabapentin (NEURONTIN) 300 MG capsule Take 1 capsule (300 mg total) by mouth every evening. 30 capsule 11    glimepiride (AMARYL) 4 MG tablet Take 1 tablet (4 mg total) by mouth daily with breakfast. 90 tablet 0    metFORMIN (GLUCOPHAGE) 1000 MG tablet " Take 1 tablet (1,000 mg total) by mouth 2 (two) times daily with meals. 180 tablet 0    multivitamin (ONE DAILY MULTIVITAMIN) per tablet Take 1 tablet by mouth once daily.      omeprazole (PRILOSEC) 40 MG capsule Take 1 capsule (40 mg total) by mouth once daily. 90 capsule 0    OPW TEST CLAIM - DO NOT FILL OPW test claim. Do not fill. 28 mL 0    valsartan (DIOVAN) 160 MG tablet Take 1 tablet (160 mg total) by mouth once daily. 90 tablet 0     No current facility-administered medications on file prior to visit.        Review of patient's allergies indicates:  No Known Allergies    Review of Systems    Objective:      Physical Exam    Assessment:       1. CKD (chronic kidney disease) stage 3, GFR 30-59 ml/min    2. Hematuria syndrome        Plan:               CKD (chronic kidney disease) stage 3, GFR 30-59 ml/min  -     Ambulatory referral/consult to Urology    Hematuria syndrome  -     Ambulatory referral/consult to Urology

## 2020-04-23 NOTE — PROGRESS NOTES
"Subjective:       Patient ID: Joann Adrian is a 74 y.o. male.    Chief Complaint: microscopic hematuria  This is a 74 y.o.  male patient that is new to me.  he is referred to me by Brenda Lopez NP with nephrology for microscopic hematuria.   The patient did not experience gross hematuria.     The patient does not take blood thinners .   he does have a history of stones. Identified on renal US 3/13/20 - "left subcm kidney stone)  The patient does not have a history of smoking.   Associated symptoms - Flank pain -he has reported left flank pain and LUQ pain. Also being evaluated by pain management physician Dr. Davis.    Had workup before- no.      Urinalysis history-  3 RBCs on micro 3/13/20; 6 RBCs on micro 2/11/20    Renal US 3/13/20 - left subcentimeter kidney stone; right upper pole AML 0.8 x 0.8 x 0.9cm  CT abdomen/pelvis with contrast 1/9/20 - no abnormal obvious large masses. Right AML on US barely visible.     Lab Results   Component Value Date    CREATININE 1.9 (H) 03/13/2020        ---  Past Medical History:   Diagnosis Date    Cataract     Coronary artery disease     Diabetes mellitus, type 2     Hyperlipidemia     Hypertension        Past Surgical History:   Procedure Laterality Date    CATARACT EXTRACTION      CATARACT EXTRACTION, BILATERAL      CORONARY ARTERY BYPASS GRAFT      2003       Family History   Problem Relation Age of Onset    Liver disease Sister     Glaucoma Neg Hx     Macular degeneration Neg Hx        Social History     Tobacco Use    Smoking status: Never Smoker    Smokeless tobacco: Never Used   Substance Use Topics    Alcohol use: Not Currently    Drug use: Not Currently       Current Outpatient Medications on File Prior to Visit   Medication Sig Dispense Refill    amitriptyline (ELAVIL) 50 MG tablet       amLODIPine (NORVASC) 10 MG tablet Take 1 tablet (10 mg total) by mouth once daily. 90 tablet 1    aspirin 81 MG Chew Take 1 tablet (81 mg total) by mouth " once daily.      atorvastatin (LIPITOR) 40 MG tablet Take 1 tablet (40 mg total) by mouth once daily. 90 tablet 3    bisoprolol (ZEBETA) 5 MG tablet Take 1 tablet (5 mg total) by mouth once daily. 90 tablet 0    gabapentin (NEURONTIN) 300 MG capsule Take 1 capsule (300 mg total) by mouth every evening. 30 capsule 11    glimepiride (AMARYL) 4 MG tablet Take 1 tablet (4 mg total) by mouth daily with breakfast. 90 tablet 0    metFORMIN (GLUCOPHAGE) 1000 MG tablet Take 1 tablet (1,000 mg total) by mouth 2 (two) times daily with meals. 180 tablet 0    multivitamin (ONE DAILY MULTIVITAMIN) per tablet Take 1 tablet by mouth once daily.      omeprazole (PRILOSEC) 40 MG capsule Take 1 capsule (40 mg total) by mouth once daily. 90 capsule 0    OPW TEST CLAIM - DO NOT FILL OPW test claim. Do not fill. 28 mL 0    valsartan (DIOVAN) 160 MG tablet Take 1 tablet (160 mg total) by mouth once daily. 90 tablet 0     No current facility-administered medications on file prior to visit.        Review of patient's allergies indicates:  No Known Allergies    Review of Systems   Constitutional: Negative for chills.   HENT: Negative for congestion.    Eyes: Negative for visual disturbance.   Respiratory: Negative for shortness of breath.    Cardiovascular: Negative for chest pain.   Gastrointestinal: Negative for abdominal distention.   Musculoskeletal: Negative for gait problem.   Skin: Negative for color change.   Neurological: Negative for dizziness.   Psychiatric/Behavioral: Negative for agitation.       Objective:      Physical Exam   Constitutional: He appears well-developed and well-nourished.   HENT:   Head: Normocephalic.   Eyes: Pupils are equal, round, and reactive to light.   Neck: Normal range of motion.   Cardiovascular: Intact distal pulses.   Pulmonary/Chest: Effort normal.   Abdominal: Soft.   Musculoskeletal: Normal range of motion.   Neurological: He is alert.   Skin: Skin is warm and dry.   Psychiatric: He has a  normal mood and affect.       Assessment:       1. Microscopic hematuria    2. CKD (chronic kidney disease) stage 3, GFR 30-59 ml/min    3. Hematuria syndrome        Plan:         1. The patient has microscopic hematuria. I counseled the patient on the American Urology Guidelines for a hematuria workup.  The criteria for microscopic hematuria is 3 red blood cells on microscopic urinalysis and the workup is recommended for any patient experiencing gross hematuria. The workup includes a CT urogram and a flexible cystoscopy performed here in the clinic. After answering all of the questions about the workup the patient was amenable in proceeding.  2. Had Ct with IV contrast in 1/2020, will obtain noncontrasted CT abd/pelvis and cystoscopy in Mid-late May.  3. Uroflow, low volume; counseled patient based off of pvr check he is emptying well.       Microscopic hematuria    CKD (chronic kidney disease) stage 3, GFR 30-59 ml/min  -     Ambulatory referral/consult to Urology  -     POCT URINE DIPSTICK WITHOUT MICROSCOPE  -     POCT Bladder Scan  -     CT Abdomen Pelvis  Without Contrast; Future; Expected date: 04/23/2020  -     Cystoscopy; Future; Expected date: 04/23/2020  -     Complex uroflowmetry    Hematuria syndrome  -     Ambulatory referral/consult to Urology  -     POCT URINE DIPSTICK WITHOUT MICROSCOPE  -     POCT Bladder Scan  -     CT Abdomen Pelvis  Without Contrast; Future; Expected date: 04/23/2020  -     Cystoscopy; Future; Expected date: 04/23/2020  -     Complex uroflowmetry

## 2020-04-23 NOTE — LETTER
April 23, 2020      Brenda Lopez, NP  [address]  [facility] [address]           [facility]  [address]  [facility] [address]  Phone: [phone]          Patient: Joann Adrian   MR Number: [id number]   YOB: 1945   Date of Visit: 4/23/2020       Dear Brenda Lopez:    Thank you for referring Joann Adrian to me for evaluation. Attached you will find relevant portions of my assessment and plan of care.    If you have questions, please do not hesitate to call me. I look forward to following Joann Adrian along with you.    Sincerely,    Liliana Holbrook MD    Enclosure  CC:  No Recipients    If you would like to receive this communication electronically, please contact [email or web address] or [phone] to request more information on [employer] access.    For providers and/or their staff who would like to refer a patient to [facility], please contact us through our one-stop-shop provider referral line, [facility], at [phone].    If you feel you have received this communication in error or would no longer like to receive these types of communications, please e-mail [email or web address]

## 2020-04-23 NOTE — TELEPHONE ENCOUNTER
"----- Message from Liliana Holbrook MD sent at 4/23/2020  2:01 PM CDT -----  No I don't believe the oral contrast would interfere with what I am looking for if you want to re-order, you can cancel my CT and just CC me or send me a message when the CT is done. You might need to alert his son because I told them no contrast was needed for me earlier this morning.       ----- Message -----  From: Karel Matthew DO  Sent: 4/23/2020   1:57 PM CDT  To: Liliana Holbrook MD    Hey,   He needs f/u for his various adenopathy findings, but renal has basically expressly denied any use of contrast in the future.    He also needs a CT chest.    Can we do a CT chest/abd/pelvis with oral contrast?    Per radiology "oral contrast medium is physiologically inert"    Would that interfere with what you are looking for?    Karel    ----- Message -----  From: Liliana Holbrook MD  Sent: 4/23/2020   1:50 PM CDT  To: Karel Matthew DO        "

## 2020-05-05 ENCOUNTER — HOSPITAL ENCOUNTER (OUTPATIENT)
Dept: RADIOLOGY | Facility: HOSPITAL | Age: 75
Discharge: HOME | End: 2020-05-05
Attending: STUDENT IN AN ORGANIZED HEALTH CARE EDUCATION/TRAINING PROGRAM
Payer: COMMERCIAL

## 2020-05-05 ENCOUNTER — HOSPITAL ENCOUNTER (OUTPATIENT)
Dept: RADIOLOGY | Facility: HOSPITAL | Age: 75
Discharge: HOME | End: 2020-05-05
Attending: FAMILY MEDICINE
Payer: COMMERCIAL

## 2020-05-05 ENCOUNTER — PATIENT MESSAGE (OUTPATIENT)
Dept: FAMILY MEDICINE | Facility: CLINIC | Age: 75
End: 2020-05-05

## 2020-05-05 DIAGNOSIS — N18.30 CKD (CHRONIC KIDNEY DISEASE) STAGE 3, GFR 30-59 ML/MIN: ICD-10-CM

## 2020-05-05 DIAGNOSIS — N20.0 KIDNEY STONES: ICD-10-CM

## 2020-05-05 DIAGNOSIS — R31.9 HEMATURIA SYNDROME: ICD-10-CM

## 2020-05-05 DIAGNOSIS — R91.1 INCIDENTAL LUNG NODULE, GREATER THAN OR EQUAL TO 8MM: ICD-10-CM

## 2020-05-05 DIAGNOSIS — R59.9 ENLARGED LYMPH NODES: ICD-10-CM

## 2020-05-05 DIAGNOSIS — R31.21 ASYMPTOMATIC MICROSCOPIC HEMATURIA: ICD-10-CM

## 2020-05-06 NOTE — TELEPHONE ENCOUNTER
Hello,   Thanks for the update. Oral contrast is fine from a nephrology perspective.  Definitely should avoid IV contrast though.  SR

## 2020-05-20 DIAGNOSIS — E78.49 OTHER HYPERLIPIDEMIA: ICD-10-CM

## 2020-05-20 DIAGNOSIS — E11.65 TYPE 2 DIABETES MELLITUS WITH HYPERGLYCEMIA, WITHOUT LONG-TERM CURRENT USE OF INSULIN: ICD-10-CM

## 2020-05-20 DIAGNOSIS — I25.10 CORONARY ARTERY DISEASE INVOLVING NATIVE CORONARY ARTERY OF NATIVE HEART WITHOUT ANGINA PECTORIS: ICD-10-CM

## 2020-05-20 DIAGNOSIS — K21.9 GASTROESOPHAGEAL REFLUX DISEASE, ESOPHAGITIS PRESENCE NOT SPECIFIED: ICD-10-CM

## 2020-05-20 DIAGNOSIS — I10 ESSENTIAL HYPERTENSION: ICD-10-CM

## 2020-05-20 DIAGNOSIS — E11.65 UNCONTROLLED TYPE 2 DIABETES MELLITUS WITH HYPERGLYCEMIA: ICD-10-CM

## 2020-05-21 ENCOUNTER — PATIENT MESSAGE (OUTPATIENT)
Dept: FAMILY MEDICINE | Facility: CLINIC | Age: 75
End: 2020-05-21

## 2020-05-21 ENCOUNTER — PATIENT MESSAGE (OUTPATIENT)
Dept: UROLOGY | Facility: CLINIC | Age: 75
End: 2020-05-21

## 2020-05-21 DIAGNOSIS — E11.65 TYPE 2 DIABETES MELLITUS WITH HYPERGLYCEMIA, WITHOUT LONG-TERM CURRENT USE OF INSULIN: ICD-10-CM

## 2020-05-21 RX ORDER — BISOPROLOL FUMARATE 5 MG/1
5 TABLET, FILM COATED ORAL DAILY
Qty: 90 TABLET | Refills: 0 | Status: SHIPPED | OUTPATIENT
Start: 2020-05-21 | End: 2020-06-16 | Stop reason: SDUPTHER

## 2020-05-21 RX ORDER — OMEPRAZOLE 40 MG/1
40 CAPSULE, DELAYED RELEASE ORAL DAILY
Qty: 90 CAPSULE | Refills: 0 | Status: SHIPPED | OUTPATIENT
Start: 2020-05-21 | End: 2020-06-16 | Stop reason: SDUPTHER

## 2020-05-21 RX ORDER — ATORVASTATIN CALCIUM 40 MG/1
40 TABLET, FILM COATED ORAL DAILY
Qty: 90 TABLET | Refills: 0 | Status: SHIPPED | OUTPATIENT
Start: 2020-05-21 | End: 2020-06-16 | Stop reason: SDUPTHER

## 2020-05-21 RX ORDER — GLIMEPIRIDE 4 MG/1
4 TABLET ORAL
Qty: 90 TABLET | Refills: 0 | Status: SHIPPED | OUTPATIENT
Start: 2020-05-21 | End: 2020-05-25 | Stop reason: SDUPTHER

## 2020-05-21 RX ORDER — AMLODIPINE BESYLATE 10 MG/1
10 TABLET ORAL DAILY
Qty: 90 TABLET | Refills: 0 | Status: SHIPPED | OUTPATIENT
Start: 2020-05-21 | End: 2020-06-16 | Stop reason: SDUPTHER

## 2020-05-21 RX ORDER — METFORMIN HYDROCHLORIDE 1000 MG/1
1000 TABLET ORAL 2 TIMES DAILY WITH MEALS
Qty: 180 TABLET | Refills: 0 | Status: SHIPPED | OUTPATIENT
Start: 2020-05-21 | End: 2020-06-18 | Stop reason: SDUPTHER

## 2020-05-25 RX ORDER — GLIMEPIRIDE 4 MG/1
4 TABLET ORAL
Qty: 90 TABLET | Refills: 0 | Status: SHIPPED | OUTPATIENT
Start: 2020-05-25 | End: 2020-10-14

## 2020-06-02 ENCOUNTER — PATIENT OUTREACH (OUTPATIENT)
Dept: ADMINISTRATIVE | Facility: OTHER | Age: 75
End: 2020-06-02

## 2020-06-03 ENCOUNTER — HOSPITAL ENCOUNTER (OUTPATIENT)
Dept: RADIOLOGY | Facility: HOSPITAL | Age: 75
Discharge: HOME | End: 2020-06-03
Attending: FAMILY MEDICINE
Payer: COMMERCIAL

## 2020-06-03 DIAGNOSIS — R91.1 LUNG NODULE: ICD-10-CM

## 2020-06-03 PROCEDURE — 71250 CT THORAX DX C-: CPT | Mod: 26,,, | Performed by: RADIOLOGY

## 2020-06-03 PROCEDURE — 25500020 PHARM REV CODE 255: Performed by: FAMILY MEDICINE

## 2020-06-03 PROCEDURE — A9698 NON-RAD CONTRAST MATERIALNOC: HCPCS | Performed by: FAMILY MEDICINE

## 2020-06-03 PROCEDURE — 71250 CT CHEST ABDOMEN PELVIS WITHOUT CONTRAST(XPD): ICD-10-PCS | Mod: 26,,, | Performed by: RADIOLOGY

## 2020-06-03 PROCEDURE — 74176 CT CHEST ABDOMEN PELVIS WITHOUT CONTRAST(XPD): ICD-10-PCS | Mod: 26,,, | Performed by: RADIOLOGY

## 2020-06-03 PROCEDURE — 74176 CT ABD & PELVIS W/O CONTRAST: CPT | Mod: TC

## 2020-06-03 PROCEDURE — 74176 CT ABD & PELVIS W/O CONTRAST: CPT | Mod: 26,,, | Performed by: RADIOLOGY

## 2020-06-03 PROCEDURE — 71250 CT THORAX DX C-: CPT | Mod: TC

## 2020-06-03 RX ADMIN — IOHEXOL 1000 ML: 9 SOLUTION ORAL at 07:06

## 2020-06-04 ENCOUNTER — PROCEDURE VISIT (OUTPATIENT)
Dept: UROLOGY | Facility: CLINIC | Age: 75
End: 2020-06-04
Payer: COMMERCIAL

## 2020-06-04 ENCOUNTER — PATIENT MESSAGE (OUTPATIENT)
Dept: FAMILY MEDICINE | Facility: CLINIC | Age: 75
End: 2020-06-04

## 2020-06-04 VITALS
SYSTOLIC BLOOD PRESSURE: 177 MMHG | HEIGHT: 66 IN | DIASTOLIC BLOOD PRESSURE: 88 MMHG | HEART RATE: 86 BPM | TEMPERATURE: 99 F | BODY MASS INDEX: 28.45 KG/M2 | WEIGHT: 177 LBS

## 2020-06-04 DIAGNOSIS — N18.30 CKD (CHRONIC KIDNEY DISEASE) STAGE 3, GFR 30-59 ML/MIN: ICD-10-CM

## 2020-06-04 DIAGNOSIS — R31.9 HEMATURIA SYNDROME: ICD-10-CM

## 2020-06-04 DIAGNOSIS — R10.9 ABDOMINAL PAIN, UNSPECIFIED ABDOMINAL LOCATION: Primary | ICD-10-CM

## 2020-06-04 LAB
BILIRUB SERPL-MCNC: ABNORMAL MG/DL
BLOOD URINE, POC: 250
CLARITY, POC UA: CLEAR
COLOR, POC UA: COLORLESS
GLUCOSE UR QL STRIP: 250
KETONES UR QL STRIP: ABNORMAL
LEUKOCYTE ESTERASE URINE, POC: ABNORMAL
NITRITE, POC UA: ABNORMAL
PH, POC UA: 5
PROTEIN, POC: 100
SPECIFIC GRAVITY, POC UA: 1
UROBILINOGEN, POC UA: ABNORMAL

## 2020-06-04 PROCEDURE — 81002 URINALYSIS NONAUTO W/O SCOPE: CPT | Mod: S$GLB,,, | Performed by: STUDENT IN AN ORGANIZED HEALTH CARE EDUCATION/TRAINING PROGRAM

## 2020-06-04 PROCEDURE — 81002 PR URINALYSIS NONAUTO W/O SCOPE: ICD-10-PCS | Mod: S$GLB,,, | Performed by: STUDENT IN AN ORGANIZED HEALTH CARE EDUCATION/TRAINING PROGRAM

## 2020-06-04 PROCEDURE — 52000 PR CYSTOURETHROSCOPY: ICD-10-PCS | Mod: S$GLB,,, | Performed by: STUDENT IN AN ORGANIZED HEALTH CARE EDUCATION/TRAINING PROGRAM

## 2020-06-04 PROCEDURE — 52000 CYSTOURETHROSCOPY: CPT | Mod: S$GLB,,, | Performed by: STUDENT IN AN ORGANIZED HEALTH CARE EDUCATION/TRAINING PROGRAM

## 2020-06-04 NOTE — PROCEDURES
Procedures     Procedure:   1. Flexible cysto-uretheroscopy    Pre Procedure Diagnosis:  1. Microscopic hematuria    Post Procedure Diagnosis:  1. Same    Surgeon: Liliana Holbrook MD    Anesthesia: 2% uro-jet lidocaine jelly for local analgesia    Procedure note:  A flexible cysto-urethroscopy was performed after consent was obtained.  The risks and benefits were explained. 2% lidocaine urojet was used for local analgesia. The genitalia was prepped and draped in the sterile fashion.     The flexible scope was advanced into the urethra and into the bladder. A urethral stricture was not seen during scope passage.     Once the cystoscope was in the bladder, we systematically surveyed the entire bladder. The bilateral ureteral orifices were identified in their normal orthotopic locations. clear bilateral ureteral efflux was identified.     The bladder was completely surveyed in a systematic fashion. No bladder tumors or lesions were seen. Normal bladder mucosa seen upon inspection.  Upon retroflexion a median lobe was noted to be absent. Prominent blood vessels at bladder neck.     As the flexible cystoscope was being withdrawn, the prostate was evaluated carefully. The lateral lobes of the prostate were noted to be moderately enlarged.     The patient tolerated the procedure well without complication.     Findings in summary:  Moderately enlarged lateral lobes of prostate.  Normal bladder mucosa.  Clear efflux of urine from bilateral UO's      Assessment: 74 y.o. male with microscopic hematuria now completed a workup     Plan:  1. CT reviewed - no renal masses or hydronephrosis seen. Left calcification is punctate and to me seems to be more vascular. No intervention needed.  2. Cystoscopy benign - prominent vessels seen at bladder neck, mostly likely etiology of microscopic hematuria.  3. The patient had a negative microscopic hematuria workup, that is to say no etiology for the microscopic hematuria was identified. The  American Urology Association guidelines recommend that if a patient with a history of persistent asymptomatic microscopic hematuria has 2 consecutive negative annual urinalyses (1/year for 2 years from the time of initial evaluation or beyond), then no further urinalyses for the purpose of evaluation of microscopic hematuria is necessary. For persistent asymptomatic microscopic hematuria after negative urologic workup, yearly urinalyses should be conducted. For persistent or recurrent asymptomatic microscopic hematuria after initial negative workup, repeat evaluation in 3-5 years should be considered. For him, likely closer to 5 years if still +microscopic hematuria can repeat workup.   4. Unclear what his left sided anterior abdominal pain might have been due to. Will CC Dr. Jiménez and Dr. Matthew.

## 2020-06-08 ENCOUNTER — LAB VISIT (OUTPATIENT)
Dept: LAB | Facility: HOSPITAL | Age: 75
End: 2020-06-08
Attending: FAMILY MEDICINE
Payer: COMMERCIAL

## 2020-06-08 DIAGNOSIS — E11.22 TYPE 2 DIABETES MELLITUS WITH STAGE 3 CHRONIC KIDNEY DISEASE, WITHOUT LONG-TERM CURRENT USE OF INSULIN: ICD-10-CM

## 2020-06-08 DIAGNOSIS — N18.30 CKD (CHRONIC KIDNEY DISEASE) STAGE 3, GFR 30-59 ML/MIN: ICD-10-CM

## 2020-06-08 DIAGNOSIS — E11.65 UNCONTROLLED TYPE 2 DIABETES MELLITUS WITH HYPERGLYCEMIA: ICD-10-CM

## 2020-06-08 DIAGNOSIS — N18.30 TYPE 2 DIABETES MELLITUS WITH STAGE 3 CHRONIC KIDNEY DISEASE, WITHOUT LONG-TERM CURRENT USE OF INSULIN: ICD-10-CM

## 2020-06-08 LAB
ALBUMIN SERPL BCP-MCNC: 3.6 G/DL (ref 3.5–5.2)
ALP SERPL-CCNC: 80 U/L (ref 55–135)
ALT SERPL W/O P-5'-P-CCNC: 22 U/L (ref 10–44)
ANION GAP SERPL CALC-SCNC: 7 MMOL/L (ref 8–16)
AST SERPL-CCNC: 19 U/L (ref 10–40)
BASOPHILS # BLD AUTO: 0.03 K/UL (ref 0–0.2)
BASOPHILS NFR BLD: 0.5 % (ref 0–1.9)
BILIRUB SERPL-MCNC: 0.5 MG/DL (ref 0.1–1)
BUN SERPL-MCNC: 21 MG/DL (ref 8–23)
CALCIUM SERPL-MCNC: 9.4 MG/DL (ref 8.7–10.5)
CHLORIDE SERPL-SCNC: 104 MMOL/L (ref 95–110)
CO2 SERPL-SCNC: 27 MMOL/L (ref 23–29)
CREAT SERPL-MCNC: 1.7 MG/DL (ref 0.5–1.4)
DIFFERENTIAL METHOD: ABNORMAL
EOSINOPHIL # BLD AUTO: 0.3 K/UL (ref 0–0.5)
EOSINOPHIL NFR BLD: 4.1 % (ref 0–8)
ERYTHROCYTE [DISTWIDTH] IN BLOOD BY AUTOMATED COUNT: 13.9 % (ref 11.5–14.5)
EST. GFR  (AFRICAN AMERICAN): 45 ML/MIN/1.73 M^2
EST. GFR  (NON AFRICAN AMERICAN): 39 ML/MIN/1.73 M^2
ESTIMATED AVG GLUCOSE: 200 MG/DL (ref 68–131)
GLUCOSE SERPL-MCNC: 173 MG/DL (ref 70–110)
HBA1C MFR BLD HPLC: 8.6 % (ref 4–5.6)
HCT VFR BLD AUTO: 44.3 % (ref 40–54)
HGB BLD-MCNC: 14.3 G/DL (ref 14–18)
IMM GRANULOCYTES # BLD AUTO: 0.03 K/UL (ref 0–0.04)
IMM GRANULOCYTES NFR BLD AUTO: 0.5 % (ref 0–0.5)
LYMPHOCYTES # BLD AUTO: 1.8 K/UL (ref 1–4.8)
LYMPHOCYTES NFR BLD: 27.3 % (ref 18–48)
MCH RBC QN AUTO: 26.7 PG (ref 27–31)
MCHC RBC AUTO-ENTMCNC: 32.3 G/DL (ref 32–36)
MCV RBC AUTO: 83 FL (ref 82–98)
MONOCYTES # BLD AUTO: 0.8 K/UL (ref 0.3–1)
MONOCYTES NFR BLD: 11.5 % (ref 4–15)
NEUTROPHILS # BLD AUTO: 3.7 K/UL (ref 1.8–7.7)
NEUTROPHILS NFR BLD: 56.1 % (ref 38–73)
NRBC BLD-RTO: 0 /100 WBC
PLATELET # BLD AUTO: 174 K/UL (ref 150–350)
PMV BLD AUTO: 10.6 FL (ref 9.2–12.9)
POTASSIUM SERPL-SCNC: 4.2 MMOL/L (ref 3.5–5.1)
PROT SERPL-MCNC: 8 G/DL (ref 6–8.4)
RBC # BLD AUTO: 5.36 M/UL (ref 4.6–6.2)
SODIUM SERPL-SCNC: 138 MMOL/L (ref 136–145)
WBC # BLD AUTO: 6.51 K/UL (ref 3.9–12.7)

## 2020-06-08 PROCEDURE — 85025 COMPLETE CBC W/AUTO DIFF WBC: CPT

## 2020-06-08 PROCEDURE — 36415 COLL VENOUS BLD VENIPUNCTURE: CPT

## 2020-06-08 PROCEDURE — 83036 HEMOGLOBIN GLYCOSYLATED A1C: CPT

## 2020-06-08 PROCEDURE — 80053 COMPREHEN METABOLIC PANEL: CPT

## 2020-06-11 ENCOUNTER — PATIENT MESSAGE (OUTPATIENT)
Dept: FAMILY MEDICINE | Facility: CLINIC | Age: 75
End: 2020-06-11

## 2020-06-11 DIAGNOSIS — E11.65 TYPE 2 DIABETES MELLITUS WITH HYPERGLYCEMIA, WITHOUT LONG-TERM CURRENT USE OF INSULIN: Primary | ICD-10-CM

## 2020-06-16 ENCOUNTER — OFFICE VISIT (OUTPATIENT)
Dept: FAMILY MEDICINE | Facility: CLINIC | Age: 75
End: 2020-06-16
Payer: COMMERCIAL

## 2020-06-16 VITALS
HEART RATE: 78 BPM | WEIGHT: 185.19 LBS | BODY MASS INDEX: 29.89 KG/M2 | TEMPERATURE: 98 F | DIASTOLIC BLOOD PRESSURE: 78 MMHG | OXYGEN SATURATION: 96 % | SYSTOLIC BLOOD PRESSURE: 130 MMHG

## 2020-06-16 DIAGNOSIS — E11.22 TYPE 2 DIABETES MELLITUS WITH STAGE 3 CHRONIC KIDNEY DISEASE, WITHOUT LONG-TERM CURRENT USE OF INSULIN: ICD-10-CM

## 2020-06-16 DIAGNOSIS — R91.1 INCIDENTAL LUNG NODULE, GREATER THAN OR EQUAL TO 8MM: ICD-10-CM

## 2020-06-16 DIAGNOSIS — K21.9 GASTROESOPHAGEAL REFLUX DISEASE, ESOPHAGITIS PRESENCE NOT SPECIFIED: ICD-10-CM

## 2020-06-16 DIAGNOSIS — R07.81 COSTAL MARGIN PAIN: ICD-10-CM

## 2020-06-16 DIAGNOSIS — E78.49 OTHER HYPERLIPIDEMIA: ICD-10-CM

## 2020-06-16 DIAGNOSIS — M54.31 SCIATICA OF RIGHT SIDE: ICD-10-CM

## 2020-06-16 DIAGNOSIS — I25.10 CORONARY ARTERY DISEASE INVOLVING NATIVE CORONARY ARTERY OF NATIVE HEART WITHOUT ANGINA PECTORIS: ICD-10-CM

## 2020-06-16 DIAGNOSIS — I10 ESSENTIAL HYPERTENSION: ICD-10-CM

## 2020-06-16 DIAGNOSIS — E11.65 UNCONTROLLED TYPE 2 DIABETES MELLITUS WITH HYPERGLYCEMIA: Primary | ICD-10-CM

## 2020-06-16 DIAGNOSIS — N18.30 TYPE 2 DIABETES MELLITUS WITH STAGE 3 CHRONIC KIDNEY DISEASE, WITHOUT LONG-TERM CURRENT USE OF INSULIN: ICD-10-CM

## 2020-06-16 PROCEDURE — 1101F PT FALLS ASSESS-DOCD LE1/YR: CPT | Mod: CPTII,S$GLB,, | Performed by: FAMILY MEDICINE

## 2020-06-16 PROCEDURE — 99999 PR PBB SHADOW E&M-EST. PATIENT-LVL III: CPT | Mod: PBBFAC,,, | Performed by: FAMILY MEDICINE

## 2020-06-16 PROCEDURE — 3078F PR MOST RECENT DIASTOLIC BLOOD PRESSURE < 80 MM HG: ICD-10-PCS | Mod: CPTII,S$GLB,, | Performed by: FAMILY MEDICINE

## 2020-06-16 PROCEDURE — 1159F MED LIST DOCD IN RCRD: CPT | Mod: S$GLB,,, | Performed by: FAMILY MEDICINE

## 2020-06-16 PROCEDURE — 3052F PR MOST RECENT HEMOGLOBIN A1C LEVEL 8.0 - < 9.0%: ICD-10-PCS | Mod: CPTII,S$GLB,, | Performed by: FAMILY MEDICINE

## 2020-06-16 PROCEDURE — 3078F DIAST BP <80 MM HG: CPT | Mod: CPTII,S$GLB,, | Performed by: FAMILY MEDICINE

## 2020-06-16 PROCEDURE — 3052F HG A1C>EQUAL 8.0%<EQUAL 9.0%: CPT | Mod: CPTII,S$GLB,, | Performed by: FAMILY MEDICINE

## 2020-06-16 PROCEDURE — 99214 OFFICE O/P EST MOD 30 MIN: CPT | Mod: S$GLB,,, | Performed by: FAMILY MEDICINE

## 2020-06-16 PROCEDURE — 1126F PR PAIN SEVERITY QUANTIFIED, NO PAIN PRESENT: ICD-10-PCS | Mod: S$GLB,,, | Performed by: FAMILY MEDICINE

## 2020-06-16 PROCEDURE — 1101F PR PT FALLS ASSESS DOC 0-1 FALLS W/OUT INJ PAST YR: ICD-10-PCS | Mod: CPTII,S$GLB,, | Performed by: FAMILY MEDICINE

## 2020-06-16 PROCEDURE — 1126F AMNT PAIN NOTED NONE PRSNT: CPT | Mod: S$GLB,,, | Performed by: FAMILY MEDICINE

## 2020-06-16 PROCEDURE — 3075F SYST BP GE 130 - 139MM HG: CPT | Mod: CPTII,S$GLB,, | Performed by: FAMILY MEDICINE

## 2020-06-16 PROCEDURE — 3075F PR MOST RECENT SYSTOLIC BLOOD PRESS GE 130-139MM HG: ICD-10-PCS | Mod: CPTII,S$GLB,, | Performed by: FAMILY MEDICINE

## 2020-06-16 PROCEDURE — 1159F PR MEDICATION LIST DOCUMENTED IN MEDICAL RECORD: ICD-10-PCS | Mod: S$GLB,,, | Performed by: FAMILY MEDICINE

## 2020-06-16 PROCEDURE — 99214 PR OFFICE/OUTPT VISIT, EST, LEVL IV, 30-39 MIN: ICD-10-PCS | Mod: S$GLB,,, | Performed by: FAMILY MEDICINE

## 2020-06-16 PROCEDURE — 99999 PR PBB SHADOW E&M-EST. PATIENT-LVL III: ICD-10-PCS | Mod: PBBFAC,,, | Performed by: FAMILY MEDICINE

## 2020-06-16 RX ORDER — ATORVASTATIN CALCIUM 40 MG/1
40 TABLET, FILM COATED ORAL DAILY
Qty: 90 TABLET | Refills: 0 | Status: SHIPPED | OUTPATIENT
Start: 2020-06-16 | End: 2020-08-11 | Stop reason: SDUPTHER

## 2020-06-16 RX ORDER — OMEPRAZOLE 20 MG/1
20 CAPSULE, DELAYED RELEASE ORAL EVERY MORNING
Qty: 90 CAPSULE | Refills: 0 | Status: SHIPPED | OUTPATIENT
Start: 2020-06-16 | End: 2020-10-14

## 2020-06-16 RX ORDER — AMLODIPINE BESYLATE 10 MG/1
10 TABLET ORAL DAILY
Qty: 90 TABLET | Refills: 0 | Status: SHIPPED | OUTPATIENT
Start: 2020-06-16 | End: 2020-08-11 | Stop reason: SDUPTHER

## 2020-06-16 RX ORDER — OMEPRAZOLE 20 MG/1
40 CAPSULE, DELAYED RELEASE ORAL EVERY MORNING
Qty: 90 CAPSULE | Refills: 0 | Status: SHIPPED | OUTPATIENT
Start: 2020-06-16 | End: 2020-06-16 | Stop reason: SDUPTHER

## 2020-06-16 RX ORDER — VALSARTAN 160 MG/1
160 TABLET ORAL DAILY
Qty: 90 TABLET | Refills: 0 | Status: SHIPPED | OUTPATIENT
Start: 2020-06-16 | End: 2020-06-18 | Stop reason: SDUPTHER

## 2020-06-16 RX ORDER — BISOPROLOL FUMARATE 5 MG/1
5 TABLET, FILM COATED ORAL DAILY
Qty: 90 TABLET | Refills: 0 | Status: SHIPPED | OUTPATIENT
Start: 2020-06-16 | End: 2020-11-23 | Stop reason: SDUPTHER

## 2020-06-16 RX ORDER — GABAPENTIN 300 MG/1
300 CAPSULE ORAL 2 TIMES DAILY
Qty: 180 CAPSULE | Refills: 0 | Status: SHIPPED | OUTPATIENT
Start: 2020-06-16 | End: 2020-10-14

## 2020-06-16 NOTE — PROGRESS NOTES
Subjective:       Patient ID: Joann Adrian is a 74 y.o. male.    Chief Complaint: Diabetes    KD is a 74 y.o. male who presents today for follow up on his DM, HTN, DLD, and CAD     DM: still elevated. Referred to endocrinology. Cost issues with jardiance and trulicity. a1c is worse. Taking amaryl and metformin.  GI Problem: CT performed. no acute issues. Right lower lobe 0.9 cm nodule. Gallstones. Repeat imaging in 3 months showed no change. No signs of kidney stone. Referred to pain management and GI.   DLD: Started atorvastatin 80 mg for cholesterol and CAD. 80 mg possibly caused GI issues. On 40 mg Now. LDL <70  HTN: on bisoprolol 5 mg, amlodipine 10 mg, valsartan 160 mg for HTN. BP controlled.   CAD: started statin. Couldn't tolerate 80 mg. On 40 mg now.  CKD: has seen renal.   Microscopic hematuria: saw urology. Workup normal. No renal masses noted. cystoscopy normal. likely closer to 5 years if still +microscopic hematuria can repeat workup.     Abdominal pain: saw pain management. Wants to see GI. CT has been negative x 2. Mild lymph nodes, unchanged on repeat imaging. Pain comes and goes on its own.     Lung nodule: repeat CT in aprox 3 months.     Still has occasional headaches.     Obesity: he has gained about 5 pounds. He does walk.     Labs as below reviewed in detail.     Most of the interview performed in a combination of mami/Azeri/english with son providing translation as needed    Review of Systems   Constitutional: Negative for chills and fever.   Eyes: Negative for visual disturbance.   Respiratory: Negative for cough and shortness of breath.    Gastrointestinal: Positive for abdominal pain. Negative for anal bleeding, blood in stool, constipation, diarrhea, nausea and vomiting.   Genitourinary: Negative for difficulty urinating and dysuria.   Neurological: Positive for headaches. Negative for dizziness and light-headedness.           Results for orders placed or performed in visit on 06/08/20    CBC auto differential   Result Value Ref Range    WBC 6.51 3.90 - 12.70 K/uL    RBC 5.36 4.60 - 6.20 M/uL    Hemoglobin 14.3 14.0 - 18.0 g/dL    Hematocrit 44.3 40.0 - 54.0 %    Mean Corpuscular Volume 83 82 - 98 fL    Mean Corpuscular Hemoglobin 26.7 (L) 27.0 - 31.0 pg    Mean Corpuscular Hemoglobin Conc 32.3 32.0 - 36.0 g/dL    RDW 13.9 11.5 - 14.5 %    Platelets 174 150 - 350 K/uL    MPV 10.6 9.2 - 12.9 fL    Immature Granulocytes 0.5 0.0 - 0.5 %    Gran # (ANC) 3.7 1.8 - 7.7 K/uL    Immature Grans (Abs) 0.03 0.00 - 0.04 K/uL    Lymph # 1.8 1.0 - 4.8 K/uL    Mono # 0.8 0.3 - 1.0 K/uL    Eos # 0.3 0.0 - 0.5 K/uL    Baso # 0.03 0.00 - 0.20 K/uL    nRBC 0 0 /100 WBC    Gran% 56.1 38.0 - 73.0 %    Lymph% 27.3 18.0 - 48.0 %    Mono% 11.5 4.0 - 15.0 %    Eosinophil% 4.1 0.0 - 8.0 %    Basophil% 0.5 0.0 - 1.9 %    Differential Method Automated    Comprehensive metabolic panel   Result Value Ref Range    Sodium 138 136 - 145 mmol/L    Potassium 4.2 3.5 - 5.1 mmol/L    Chloride 104 95 - 110 mmol/L    CO2 27 23 - 29 mmol/L    Glucose 173 (H) 70 - 110 mg/dL    BUN, Bld 21 8 - 23 mg/dL    Creatinine 1.7 (H) 0.5 - 1.4 mg/dL    Calcium 9.4 8.7 - 10.5 mg/dL    Total Protein 8.0 6.0 - 8.4 g/dL    Albumin 3.6 3.5 - 5.2 g/dL    Total Bilirubin 0.5 0.1 - 1.0 mg/dL    Alkaline Phosphatase 80 55 - 135 U/L    AST 19 10 - 40 U/L    ALT 22 10 - 44 U/L    Anion Gap 7 (L) 8 - 16 mmol/L    eGFR if African American 45 (A) >60 mL/min/1.73 m^2    eGFR if non African American 39 (A) >60 mL/min/1.73 m^2   Hemoglobin A1c   Result Value Ref Range    Hemoglobin A1C 8.6 (H) 4.0 - 5.6 %    Estimated Avg Glucose 200 (H) 68 - 131 mg/dL       Objective:     Vitals:    06/16/20 1121   BP: 130/78   Pulse: 78   Temp: 98.2 °F (36.8 °C)        Physical Exam  Vitals signs and nursing note reviewed.   Constitutional:       Appearance: He is well-developed.   HENT:      Head: Normocephalic and atraumatic.   Eyes:      Conjunctiva/sclera: Conjunctivae normal.    Neck:      Musculoskeletal: Normal range of motion and neck supple.   Cardiovascular:      Rate and Rhythm: Normal rate and regular rhythm.      Comments: Midline sternal scar noted  Pulmonary:      Effort: Pulmonary effort is normal.      Breath sounds: Normal breath sounds.   Abdominal:      Palpations: Abdomen is soft. Abdomen is not rigid.      Tenderness: There is abdominal tenderness in the left upper quadrant. There is no guarding or rebound. Negative signs include Raymond's sign.   Feet:      Left foot:      Skin integrity: No callus.   Skin:     General: Skin is warm.      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   Psychiatric:         Behavior: Behavior normal.         Thought Content: Thought content normal.         Judgment: Judgment normal.         Assessment:       1. Uncontrolled type 2 diabetes mellitus with hyperglycemia    2. Sciatica of right side    3. Gastroesophageal reflux disease, esophagitis presence not specified    4. Essential hypertension    5. Other hyperlipidemia    6. Coronary artery disease involving native coronary artery of native heart without angina pectoris    7. Type 2 diabetes mellitus with stage 3 chronic kidney disease, without long-term current use of insulin    8. BMI 29.0-29.9,adult    9. Incidental lung nodule, greater than or equal to 8mm    10. Costal margin pain        Plan:         Likely needs insulin  Defer to endocrine  May need to decrease metformin based on kidney function  Unable to afford trulicity, discussed with [facility]  If insulin started, will have to stop amaryl? Pending endocrine evaluation.     Decrease omeprazole to 20 mg    Continue valsartan 160 mg, bisoprolol 5 mg, amldoipine 10 mg    Increase gabapentin to 300 mg twice daily    Kidney function is a little low. No NSAIDS such as ibuprofen or naproxen. Avoid Red meats. Drink a lot  of water. I will continue monitoring kidney function q3-6 months.    Repeat CT chest prior to f/u apt.     Keep pain management apt, if injection fails, keep apt with GI, may need EGD? Patient and son would like Gi apt.     Uncontrolled type 2 diabetes mellitus with hyperglycemia    Sciatica of right side  -     gabapentin (NEURONTIN) 300 MG capsule; Take 1 capsule (300 mg total) by mouth 2 (two) times daily.  Dispense: 180 capsule; Refill: 0    Gastroesophageal reflux disease, esophagitis presence not specified  -     Discontinue: omeprazole (PRILOSEC) 20 MG capsule; Take 2 capsules (40 mg total) by mouth every morning.  Dispense: 90 capsule; Refill: 0  -     omeprazole (PRILOSEC) 20 MG capsule; Take 1 capsule (20 mg total) by mouth every morning. Take in place of 40 mg  Dispense: 90 capsule; Refill: 0    Essential hypertension  -     valsartan (DIOVAN) 160 MG tablet; Take 1 tablet (160 mg total) by mouth once daily.  Dispense: 90 tablet; Refill: 0  -     amLODIPine (NORVASC) 10 MG tablet; Take 1 tablet (10 mg total) by mouth once daily.  Dispense: 90 tablet; Refill: 0  -     bisoprolol (ZEBETA) 5 MG tablet; Take 1 tablet (5 mg total) by mouth once daily.  Dispense: 90 tablet; Refill: 0    Other hyperlipidemia  -     atorvastatin (LIPITOR) 40 MG tablet; Take 1 tablet (40 mg total) by mouth once daily.  Dispense: 90 tablet; Refill: 0    Coronary artery disease involving native coronary artery of native heart without angina pectoris  -     bisoprolol (ZEBETA) 5 MG tablet; Take 1 tablet (5 mg total) by mouth once daily.  Dispense: 90 tablet; Refill: 0    Type 2 diabetes mellitus with stage 3 chronic kidney disease, without long-term current use of insulin    BMI 29.0-29.9,adult    Incidental lung nodule, greater than or equal to 8mm    Costal margin pain        Warning signs discussed, patient to call with any further issues or worsening of symptoms.

## 2020-06-16 NOTE — PATIENT INSTRUCTIONS
Likely needs insulin  Defer to endocrine  May need to decrease metformin based on kidney function  Unable to afford trulicity, discussed with [facility]  If insulin started, will have to stop amaryl?    Decrease omeprazole to 20 mg    Continue valsartan 160 mg, bisoprolol 5 mg, amldoipine 10 mg    Increase gabapentin to 300 mg twice daily    Keep pain management apt, if injection fails, keep apt with GI, may need EGD?    Kidney function is a little low. No NSAIDS such as ibuprofen or naproxen. Avoid Red meats. Drink a lot of water. I will continue monitoring kidney function q3-6 months.    Diabetes Management Status    Statin: Taking  ACE/ARB: Taking    Screening or Prevention Patient's value Goal Complete/Controlled?   HgA1C Testing and Control   Lab Results   Component Value Date    HGBA1C 8.6 (H) 06/08/2020      Annually/Less than 8% No   Lipid profile : 02/11/2020 Annually Yes   LDL control Lab Results   Component Value Date    LDLCALC 39.8 (L) 02/11/2020    Annually/Less than 100 mg/dl  Yes   Nephropathy screening Lab Results   Component Value Date    LABMICR 808.0 02/11/2020     Lab Results   Component Value Date    PROTEINUA 2+ (A) 03/13/2020    Annually Yes   Blood pressure BP Readings from Last 1 Encounters:   06/16/20 130/78    Less than 140/90 Yes   Dilated retinal exam : 03/09/2020 Annually Yes   Foot exam   : 11/21/2019 Annually Yes

## 2020-06-18 ENCOUNTER — PATIENT OUTREACH (OUTPATIENT)
Dept: ADMINISTRATIVE | Facility: OTHER | Age: 75
End: 2020-06-18

## 2020-06-18 ENCOUNTER — OFFICE VISIT (OUTPATIENT)
Dept: ENDOCRINOLOGY | Facility: CLINIC | Age: 75
End: 2020-06-18
Payer: COMMERCIAL

## 2020-06-18 VITALS
TEMPERATURE: 99 F | HEART RATE: 71 BPM | RESPIRATION RATE: 18 BRPM | WEIGHT: 186.81 LBS | OXYGEN SATURATION: 98 % | SYSTOLIC BLOOD PRESSURE: 124 MMHG | DIASTOLIC BLOOD PRESSURE: 74 MMHG | HEIGHT: 66 IN | BODY MASS INDEX: 30.02 KG/M2

## 2020-06-18 DIAGNOSIS — N18.30 TYPE 2 DIABETES MELLITUS WITH STAGE 3 CHRONIC KIDNEY DISEASE, WITHOUT LONG-TERM CURRENT USE OF INSULIN: ICD-10-CM

## 2020-06-18 DIAGNOSIS — E11.22 TYPE 2 DIABETES MELLITUS WITH STAGE 3 CHRONIC KIDNEY DISEASE, WITHOUT LONG-TERM CURRENT USE OF INSULIN: ICD-10-CM

## 2020-06-18 DIAGNOSIS — E11.65 TYPE 2 DIABETES MELLITUS WITH HYPERGLYCEMIA, WITHOUT LONG-TERM CURRENT USE OF INSULIN: Primary | ICD-10-CM

## 2020-06-18 DIAGNOSIS — I25.10 CORONARY ARTERY DISEASE INVOLVING NATIVE CORONARY ARTERY OF NATIVE HEART WITHOUT ANGINA PECTORIS: ICD-10-CM

## 2020-06-18 DIAGNOSIS — I10 ESSENTIAL HYPERTENSION: ICD-10-CM

## 2020-06-18 DIAGNOSIS — E11.65 UNCONTROLLED TYPE 2 DIABETES MELLITUS WITH HYPERGLYCEMIA: ICD-10-CM

## 2020-06-18 DIAGNOSIS — E66.09 CLASS 1 OBESITY DUE TO EXCESS CALORIES WITH SERIOUS COMORBIDITY AND BODY MASS INDEX (BMI) OF 30.0 TO 30.9 IN ADULT: ICD-10-CM

## 2020-06-18 PROCEDURE — 3078F DIAST BP <80 MM HG: CPT | Mod: CPTII,S$GLB,, | Performed by: INTERNAL MEDICINE

## 2020-06-18 PROCEDURE — 3052F PR MOST RECENT HEMOGLOBIN A1C LEVEL 8.0 - < 9.0%: ICD-10-PCS | Mod: CPTII,S$GLB,, | Performed by: INTERNAL MEDICINE

## 2020-06-18 PROCEDURE — 3008F BODY MASS INDEX DOCD: CPT | Mod: CPTII,S$GLB,, | Performed by: INTERNAL MEDICINE

## 2020-06-18 PROCEDURE — 1101F PR PT FALLS ASSESS DOC 0-1 FALLS W/OUT INJ PAST YR: ICD-10-PCS | Mod: CPTII,S$GLB,, | Performed by: INTERNAL MEDICINE

## 2020-06-18 PROCEDURE — 99999 PR PBB SHADOW E&M-EST. PATIENT-LVL IV: ICD-10-PCS | Mod: PBBFAC,,, | Performed by: INTERNAL MEDICINE

## 2020-06-18 PROCEDURE — 3052F HG A1C>EQUAL 8.0%<EQUAL 9.0%: CPT | Mod: CPTII,S$GLB,, | Performed by: INTERNAL MEDICINE

## 2020-06-18 PROCEDURE — 1101F PT FALLS ASSESS-DOCD LE1/YR: CPT | Mod: CPTII,S$GLB,, | Performed by: INTERNAL MEDICINE

## 2020-06-18 PROCEDURE — 3008F PR BODY MASS INDEX (BMI) DOCUMENTED: ICD-10-PCS | Mod: CPTII,S$GLB,, | Performed by: INTERNAL MEDICINE

## 2020-06-18 PROCEDURE — 3078F PR MOST RECENT DIASTOLIC BLOOD PRESSURE < 80 MM HG: ICD-10-PCS | Mod: CPTII,S$GLB,, | Performed by: INTERNAL MEDICINE

## 2020-06-18 PROCEDURE — 1125F AMNT PAIN NOTED PAIN PRSNT: CPT | Mod: S$GLB,,, | Performed by: INTERNAL MEDICINE

## 2020-06-18 PROCEDURE — 1159F MED LIST DOCD IN RCRD: CPT | Mod: S$GLB,,, | Performed by: INTERNAL MEDICINE

## 2020-06-18 PROCEDURE — 1125F PR PAIN SEVERITY QUANTIFIED, PAIN PRESENT: ICD-10-PCS | Mod: S$GLB,,, | Performed by: INTERNAL MEDICINE

## 2020-06-18 PROCEDURE — 99204 PR OFFICE/OUTPT VISIT, NEW, LEVL IV, 45-59 MIN: ICD-10-PCS | Mod: S$GLB,,, | Performed by: INTERNAL MEDICINE

## 2020-06-18 PROCEDURE — 3074F PR MOST RECENT SYSTOLIC BLOOD PRESSURE < 130 MM HG: ICD-10-PCS | Mod: CPTII,S$GLB,, | Performed by: INTERNAL MEDICINE

## 2020-06-18 PROCEDURE — 99204 OFFICE O/P NEW MOD 45 MIN: CPT | Mod: S$GLB,,, | Performed by: INTERNAL MEDICINE

## 2020-06-18 PROCEDURE — 3074F SYST BP LT 130 MM HG: CPT | Mod: CPTII,S$GLB,, | Performed by: INTERNAL MEDICINE

## 2020-06-18 PROCEDURE — 1159F PR MEDICATION LIST DOCUMENTED IN MEDICAL RECORD: ICD-10-PCS | Mod: S$GLB,,, | Performed by: INTERNAL MEDICINE

## 2020-06-18 PROCEDURE — 99999 PR PBB SHADOW E&M-EST. PATIENT-LVL IV: CPT | Mod: PBBFAC,,, | Performed by: INTERNAL MEDICINE

## 2020-06-18 RX ORDER — METFORMIN HYDROCHLORIDE 500 MG/1
500 TABLET ORAL 2 TIMES DAILY WITH MEALS
Qty: 180 TABLET | Refills: 4 | Status: SHIPPED | OUTPATIENT
Start: 2020-06-18 | End: 2021-02-17 | Stop reason: SDUPTHER

## 2020-06-18 RX ORDER — VALSARTAN 160 MG/1
160 TABLET ORAL DAILY
Qty: 90 TABLET | Refills: 0 | Status: SHIPPED | OUTPATIENT
Start: 2020-06-18 | End: 2021-02-17 | Stop reason: SDUPTHER

## 2020-06-18 NOTE — LETTER
June 18, 2020      Karel Matthew, DO  [address] [facility]  [place] [address]           [facility] Endocrinology 6th [place]  [address]  [facility] [address]  Phone: [phone]  Fax: [phone]          Patient: Joann Adrian   MR Number: [id number]   YOB: 1945   Date of Visit: 6/18/2020       Dear Dr. Karel Matthew:    Thank you for referring Joann Adrian to me for evaluation. Attached you will find relevant portions of my assessment and plan of care.    If you have questions, please do not hesitate to call me. I look forward to following Joann Adrian along with you.    Sincerely,    Curt Kumar MD    Enclosure  CC:  No Recipients    If you would like to receive this communication electronically, please contact [email or web address] or [phone] to request more information on [employer] Link access.    For providers and/or their staff who would like to refer a patient to [facility], please contact us through our one-stop-shop provider referral line, [facility], at [phone].    If you feel you have received this communication in error or would no longer like to receive these types of communications, please e-mail [email or web address]

## 2020-06-18 NOTE — PROGRESS NOTES
Subjective:      Chief Complaint: Referral for type 2 diabetes mellitus    HPI: Joann Adrian is a 74 y.o. male who is here for an initial evaluation for type 2 diabetes    Patient speaks some English, but his son was accompanying him to provide translation services. They did not want a 3rd party .    With regards to the diabetes:  The patient was initially diagnosed with Type 2 diabetes mellitus: for many years. Previously treated outside the [place].    Current symptoms/problems include left abdominal/flank pain, which has been ongoing for the past 3 months. Pain is exacerbated by light touch of the skin and relieved by deep palpation of the area.  He has a history of shingles on his face previously with persistent neuralgia, and he says this pain feels very similar to that.  He has had an abdominal/pelvic CT which did not show any cause of the pain.  He is seeing pain management to address this issue.    Main problem related to his diabetes is that last time he try to get Trulicity filled, the pharmacy wanted to charge  dollars.  His son says that he brought the coupon for 25 dollar co-pay per month, but the pharmacy would not accepted.  It is unclear why this was, because the patient does not have Medicare insurance and is currently under Blue [employer] Blue Shield private insurance.  He should qualify for the Program based on this.  His wife was also recently prescribed Trulicity and was able to get it and 25 dollars per month.  She has the exact same insurance plan as he does.    Known diabetic complications: nephropathy  Cardiovascular risk factors: advanced age (older than 55 for men, 65 for women), diabetes mellitus, dyslipidemia, hypertension, male gender, microalbuminuria, obesity (BMI >= 30 kg/m2) and history of CAD  Current diabetic medications include:   1. Metformin 1000 mg BID (breakfast/supper)  2. Glimepiride 4 mg daily with breakfast    Other medications tried:  1. Trulicity - too  expensive on insurance plan  2. Jardiance - too expensive on insurance plan    Met with education?: Yes  Current diet: Plan to start decreasing portion size in effort to lose weight  Current exercise: Walking    Current monitoring regimen: home blood tests - 1 times daily  Home blood sugar records: fasting range: 120-160s  Any episodes of hypoglycemia? no    Diabetic Health Maintenance:          BP Readings from Last 3 Encounters:   06/18/20 124/74   06/16/20 130/78   06/04/20 (!) 177/88           Low dose ASA?: Yes        Glucagon emergency kit?: No        Medical alert tag?: No    Wt Readings from Last 10 Encounters:   06/18/20 84.8 kg (186 lb 13.4 oz)   06/16/20 84 kg (185 lb 3 oz)   06/04/20 80.3 kg (177 lb)   04/23/20 80.5 kg (177 lb 7.5 oz)   03/12/20 83.2 kg (183 lb 6.8 oz)   03/10/20 83.9 kg (185 lb)   03/05/20 83.9 kg (185 lb)   02/20/20 81.6 kg (179 lb 14.3 oz)   01/02/20 81.6 kg (179 lb 14.3 oz)   11/21/19 81 kg (178 lb 9.2 oz)       Diabetes Management Status    Statin: Taking  ACE/ARB: Taking    Screening or Prevention Patient's value Goal Complete/Controlled?   HgA1C Testing and Control   Lab Results   Component Value Date    HGBA1C 8.6 (H) 06/08/2020      Annually/Less than 8% No   Lipid profile : 02/11/2020 Annually Yes   LDL control Lab Results   Component Value Date    LDLCALC 39.8 (L) 02/11/2020    Annually/Less than 100 mg/dl  Yes   Nephropathy screening Lab Results   Component Value Date    LABMICR 808.0 02/11/2020     Lab Results   Component Value Date    PROTEINUA 2+ (A) 03/13/2020    Annually Yes   Blood pressure BP Readings from Last 1 Encounters:   06/18/20 124/74    Less than 140/90 Yes   Dilated retinal exam : 03/09/2020 Annually Yes   Foot exam   : 11/21/2019 Annually Yes        Lab Results   Component Value Date    HGBA1C 8.6 (H) 06/08/2020    HGBA1C 8.1 (H) 02/11/2020         Reviewed past medical, family, social history and updated as appropriate.    Review of Systems    Constitutional: Negative for unexpected weight change.   Eyes: Negative for visual disturbance.   Respiratory: Negative for shortness of breath.    Cardiovascular: Negative for chest pain.   Gastrointestinal: Negative for abdominal pain.   Genitourinary: Negative for urgency.   Musculoskeletal: Positive for arthralgias.   Skin: Negative for wound.   Neurological: Negative for headaches.   Hematological: Does not bruise/bleed easily.   Psychiatric/Behavioral: Negative for sleep disturbance.   See HPI    Objective:     Vitals:    06/18/20 1032   BP: 124/74   Pulse: 71   Resp: 18   Temp: 98.6 °F (37 °C)     BP Readings from Last 5 Encounters:   06/18/20 124/74   06/16/20 130/78   06/04/20 (!) 177/88   04/23/20 (!) 158/91   03/12/20 138/80       Physical Exam  Vitals signs and nursing note reviewed.   Constitutional:       Appearance: He is well-developed.   HENT:      Right Ear: External ear normal.      Left Ear: External ear normal.      Nose: Nose normal.   Neck:      Thyroid: No thyromegaly.      Trachea: No tracheal deviation.   Cardiovascular:      Rate and Rhythm: Normal rate.      Heart sounds: No murmur.   Pulmonary:      Effort: Pulmonary effort is normal.      Breath sounds: Normal breath sounds.   Abdominal:      Palpations: Abdomen is soft. There is no mass.      Tenderness: There is no abdominal tenderness.   Musculoskeletal:      Comments: No digital clubbing or extremity cyanosis   Skin:     Findings: No rash (no rash over area of pain).      Comments: No subcutaneous nodules noted.  Light palpation of the skin in the LLQ of the abdomen causes pain.   Neurological:      Mental Status: He is alert and oriented to person, place, and time.      Coordination: Coordination normal.   Psychiatric:         Behavior: Behavior normal.      Comments: Alert and oriented to person, place, and situation.         Wt Readings from Last 10 Encounters:   06/18/20 1032 84.8 kg (186 lb 13.4 oz)   06/16/20 1121 84 kg (185  lb 3 oz)   06/04/20 1329 80.3 kg (177 lb)   04/23/20 1136 80.5 kg (177 lb 7.5 oz)   03/12/20 1323 83.2 kg (183 lb 6.8 oz)   03/10/20 0917 83.9 kg (185 lb)   03/05/20 1312 83.9 kg (185 lb)   02/20/20 1054 81.6 kg (179 lb 14.3 oz)   01/02/20 1039 81.6 kg (179 lb 14.3 oz)   11/21/19 1004 81 kg (178 lb 9.2 oz)       Lab Results   Component Value Date    HGBA1C 8.6 (H) 06/08/2020     Lab Results   Component Value Date    CHOL 104 (L) 02/11/2020    HDL 31 (L) 02/11/2020    LDLCALC 39.8 (L) 02/11/2020    TRIG 166 (H) 02/11/2020    CHOLHDL 29.8 02/11/2020     Lab Results   Component Value Date     06/08/2020    K 4.2 06/08/2020     06/08/2020    CO2 27 06/08/2020     (H) 06/08/2020    BUN 21 06/08/2020    CREATININE 1.7 (H) 06/08/2020    CALCIUM 9.4 06/08/2020    PROT 8.0 06/08/2020    ALBUMIN 3.6 06/08/2020    BILITOT 0.5 06/08/2020    ALKPHOS 80 06/08/2020    AST 19 06/08/2020    ALT 22 06/08/2020    ANIONGAP 7 (L) 06/08/2020    ESTGFRAFRICA 45 (A) 06/08/2020    EGFRNONAA 39 (A) 06/08/2020      Lab Results   Component Value Date    MICALBCREAT 346.8 (H) 02/11/2020       Assessment/Plan:     Class 1 obesity due to excess calories in adult  Discussed dietary modification.  Adding Trulicity, which should help with weight loss efforts.    Uncontrolled type 2 diabetes mellitus with hyperglycemia  Reviewed goals of therapy are to get the best control we can without hypoglycemia    I don't see any reason he should not qualify for Trulicity at $25/month with the coupon.  [facility] prescription to [facility] where they were recently able to fill Trulicity for his wife and $25/month.  I told him to please let me know if he was not able to get it there, so that I can send a message to our pharmacy assistance     Medication changes:   Start:   1. Trulicity 0.75 mg weekly  Continue:    1. Glimepiride 4 mg daily  Decrease:  1. Metformin 1000 > 500 mg BID (decreased GFR)     Advised frequent self blood glucose  monitoring. Patient encouraged to document glucose results and bring them to every clinic visit. Logs provided. Advised to start checking BG during the day alternating between different meals and bedtime.    Hypoglycemia precautions discussed. Getting him off glimepiride eventually would be preferable.    Close adherence to lifestyle changes recommended.      Eyes: UTD on eye exam.  Feet: UTD  Kidneys: UTD  HbA1c: Ordered for 3 months  Lipids: On statin  ASA: Yes    Lab Results   Component Value Date    HGBA1C 8.6 (H) 06/08/2020    HGBA1C 8.1 (H) 02/11/2020         Coronary artery disease involving native coronary artery of native heart without angina pectoris  Trulicity has shown to decrease MACE in secondary prevention.      RTC in 3 months

## 2020-06-18 NOTE — ASSESSMENT & PLAN NOTE
Reviewed goals of therapy are to get the best control we can without hypoglycemia    I don't see any reason he should not qualify for Trulicity at $25/month with the coupon.  I sent a prescription to [facility] pharmacy where they were recently able to fill Trulicity for his wife and $25/month.  I told him to please let me know if he was not able to get it there, so that I can send a message to our pharmacy assistance     Medication changes:   Start:   1. Trulicity 0.75 mg weekly  Continue:    1. Glimepiride 4 mg daily  Decrease:  1. Metformin 1000 > 500 mg BID (decreased GFR)     Advised frequent self blood glucose monitoring. Patient encouraged to document glucose results and bring them to every clinic visit. Logs provided. Advised to start checking BG during the day alternating between different meals and bedtime.    Hypoglycemia precautions discussed. Getting him off glimepiride eventually would be preferable.    Close adherence to lifestyle changes recommended.      Eyes: UTD on eye exam.  Feet: UTD  Kidneys: UTD  HbA1c: Ordered for 3 months  Lipids: On statin  ASA: Yes    Lab Results   Component Value Date    HGBA1C 8.6 (H) 06/08/2020    HGBA1C 8.1 (H) 02/11/2020

## 2020-06-22 ENCOUNTER — OFFICE VISIT (OUTPATIENT)
Dept: PAIN MEDICINE | Facility: CLINIC | Age: 75
End: 2020-06-22
Payer: COMMERCIAL

## 2020-06-22 VITALS — DIASTOLIC BLOOD PRESSURE: 83 MMHG | SYSTOLIC BLOOD PRESSURE: 134 MMHG | HEART RATE: 91 BPM

## 2020-06-22 DIAGNOSIS — M79.18 MYOFASCIAL PAIN: ICD-10-CM

## 2020-06-22 DIAGNOSIS — R10.12 LEFT UPPER QUADRANT PAIN: Primary | ICD-10-CM

## 2020-06-22 DIAGNOSIS — M79.2 NEUROPATHIC PAIN: ICD-10-CM

## 2020-06-22 PROCEDURE — 1159F PR MEDICATION LIST DOCUMENTED IN MEDICAL RECORD: ICD-10-PCS | Mod: S$GLB,,, | Performed by: PAIN MEDICINE

## 2020-06-22 PROCEDURE — 20553 NJX 1/MLT TRIGGER POINTS 3/>: CPT | Mod: S$GLB,,, | Performed by: PAIN MEDICINE

## 2020-06-22 PROCEDURE — 99214 PR OFFICE/OUTPT VISIT, EST, LEVL IV, 30-39 MIN: ICD-10-PCS | Mod: 25,S$GLB,, | Performed by: PAIN MEDICINE

## 2020-06-22 PROCEDURE — 20553 PR INJECT TRIGGER POINTS, > 3: ICD-10-PCS | Mod: S$GLB,,, | Performed by: PAIN MEDICINE

## 2020-06-22 PROCEDURE — 1125F AMNT PAIN NOTED PAIN PRSNT: CPT | Mod: S$GLB,,, | Performed by: PAIN MEDICINE

## 2020-06-22 PROCEDURE — 99999 PR PBB SHADOW E&M-EST. PATIENT-LVL III: ICD-10-PCS | Mod: PBBFAC,,, | Performed by: PAIN MEDICINE

## 2020-06-22 PROCEDURE — 99999 PR PBB SHADOW E&M-EST. PATIENT-LVL III: CPT | Mod: PBBFAC,,, | Performed by: PAIN MEDICINE

## 2020-06-22 PROCEDURE — 76942 PR U/S GUIDANCE FOR NEEDLE GUIDANCE: ICD-10-PCS | Mod: S$GLB,,, | Performed by: PAIN MEDICINE

## 2020-06-22 PROCEDURE — 3079F DIAST BP 80-89 MM HG: CPT | Mod: CPTII,S$GLB,, | Performed by: PAIN MEDICINE

## 2020-06-22 PROCEDURE — 99214 OFFICE O/P EST MOD 30 MIN: CPT | Mod: 25,S$GLB,, | Performed by: PAIN MEDICINE

## 2020-06-22 PROCEDURE — 3075F SYST BP GE 130 - 139MM HG: CPT | Mod: CPTII,S$GLB,, | Performed by: PAIN MEDICINE

## 2020-06-22 PROCEDURE — 1159F MED LIST DOCD IN RCRD: CPT | Mod: S$GLB,,, | Performed by: PAIN MEDICINE

## 2020-06-22 PROCEDURE — 1101F PT FALLS ASSESS-DOCD LE1/YR: CPT | Mod: CPTII,S$GLB,, | Performed by: PAIN MEDICINE

## 2020-06-22 PROCEDURE — 3079F PR MOST RECENT DIASTOLIC BLOOD PRESSURE 80-89 MM HG: ICD-10-PCS | Mod: CPTII,S$GLB,, | Performed by: PAIN MEDICINE

## 2020-06-22 PROCEDURE — 3075F PR MOST RECENT SYSTOLIC BLOOD PRESS GE 130-139MM HG: ICD-10-PCS | Mod: CPTII,S$GLB,, | Performed by: PAIN MEDICINE

## 2020-06-22 PROCEDURE — 1101F PR PT FALLS ASSESS DOC 0-1 FALLS W/OUT INJ PAST YR: ICD-10-PCS | Mod: CPTII,S$GLB,, | Performed by: PAIN MEDICINE

## 2020-06-22 PROCEDURE — 76942 ECHO GUIDE FOR BIOPSY: CPT | Mod: S$GLB,,, | Performed by: PAIN MEDICINE

## 2020-06-22 PROCEDURE — 1125F PR PAIN SEVERITY QUANTIFIED, PAIN PRESENT: ICD-10-PCS | Mod: S$GLB,,, | Performed by: PAIN MEDICINE

## 2020-06-22 NOTE — PROGRESS NOTES
Pain Medicine     Chief Complaint:   Chief Complaint   Patient presents with    Abdominal Pain     History of Present Illness:  Joann Adrian is a 74 y.o. male referred by No ref. provider found for LUQ abdominal pain.      He also presents with right sided face/head pain, and right hip/leg pain. Head/facial pain is his worst pain.     Onset: Shingles on right side of head 2 years ago, with persisting pain in that area. Two months of LUQ abdominal pain. Right hip and right SIJ pain laterally over GTB started 3 months ago. No clear inciting incident  Location: Right side of face above eye (V1 distribution). LUQ abdominal. Right lateral hip and SIJ.   Radiation: Right side of face radiates to back of head. LUQ abdominal pain radiates into lower quadrant on the left. Right hip pain radiates down the right leg.   Timing: constant  Quality: Face is burning. LUQ is burning, Aching and Dull. Right hip/leg pain feels aching  Exacerbating Factors: Touching the face. nothing in particular aggravates the abdomen or right hip/leg pain.   Alleviating Factors: medications  Associated Symptoms: denies night fever/night sweats, urinary incontinence, bowel incontinence, significant weight loss, significant motor weakness and loss of sensations    Severity: Currently: 6 /10   Typical Range: 1-8/10     Exacerbation: 7/10     He previously saw Dr. Eagle for pain.     Interval History (4/7/2020)  Joann Adrian returns today for follow up.  At the last clinic visit, encouraged daily light exercise, considered abdominal TPI after abdominal US, increased gabapentin to 600 mg at night (goal will be 600 mg BID), recommended salon pas patches for PHN facial pain, ordered x-ray and MRI of lumbar spine.  Currently, the face, back, and abdominal pain is stable.    The increased gabapentin made him dizzy. They did not try salon pas patches.   The facial pain is the most bothersome. No changes in location or character.   Abdominal pain is the  second most bothersome. Denies changes in bowels, N/V. He does get constipated at times. Pain goes from the ribs down to the LLQ.   No change in the location or quality of the pain since the most recent visit is reported.  No significant interval events or traumas. No change in bowel or bladder function, & no new weakness or numbness is reported. No new musculoskeletal pain complaints.    Interval History 6/22/20  Patient was see by me today, Dr. Godfrey, as cross coverage for Dr. Davis.  Patient reports LUQ pain in the abdominal wall felt as tingling, burning, and shooting pain that wraps around the left flank toward the back.  He points to an area roughly equivalent to the T7/8 dermatome.  His history is significant for right facial pain above the eye that was previously diagnosed as shingles; he reports occasional burning, tingling, and numbness in that area.  Pain in the left flank feels similar to pain in the right face, but he denies ever having herpetic lesions or a visible outbreak in the flank.    Pain Disability Index  Family/Home Responsibilities:: 7  Recreation:: 7  Social Activity:: 7  Occupation:: 7  Sexual Behavior:: 0  Self Care:: 7  Life-Support Activities:: 7  Pain Disability Index (PDI): 42       Previous Interventions:  - None    Previous Therapies:  PT/OT: yes, doesn't feel like it is helping  Surgery: no surgery of spine or abdomen  Previous Medications:   - NSAIDS: Tylenol. Advil helps some  - Muscle Relaxants:    - TCAs:   - SNRIs:   - Topicals:   - Anticonvulsants:  Lyrica didn't help.   - Opioids: Took something from Dr. Bolivar which caused sedation    Current Pain Medications:  1. Tylenol  2. Gabapentin 400 mg every evening     Blood Thinners: ASA 81 mg     Full Medication List:    Current Outpatient Medications:     amLODIPine (NORVASC) 10 MG tablet, Take 1 tablet (10 mg total) by mouth once daily., Disp: 90 tablet, Rfl: 0    aspirin 81 MG Chew, Take 1 tablet (81 mg total) by mouth  once daily., Disp: , Rfl:     atorvastatin (LIPITOR) 40 MG tablet, Take 1 tablet (40 mg total) by mouth once daily., Disp: 90 tablet, Rfl: 0    bisoprolol (ZEBETA) 5 MG tablet, Take 1 tablet (5 mg total) by mouth once daily., Disp: 90 tablet, Rfl: 0    dulaglutide (TRULICITY) 0.75 mg/0.5 mL PnIj, Inject 0.5 mLs (0.75 mg total) into the skin once a week., Disp: 2 mL, Rfl: 11    gabapentin (NEURONTIN) 300 MG capsule, Take 1 capsule (300 mg total) by mouth 2 (two) times daily., Disp: 180 capsule, Rfl: 0    glimepiride (AMARYL) 4 MG tablet, Take 1 tablet (4 mg total) by mouth daily with breakfast., Disp: 90 tablet, Rfl: 0    metFORMIN (GLUCOPHAGE) 500 MG tablet, Take 1 tablet (500 mg total) by mouth 2 (two) times daily with meals., Disp: 180 tablet, Rfl: 4    multivitamin (ONE DAILY MULTIVITAMIN) per tablet, Take 1 tablet by mouth once daily., Disp: , Rfl:     omeprazole (PRILOSEC) 20 MG capsule, Take 1 capsule (20 mg total) by mouth every morning. Take in place of 40 mg, Disp: 90 capsule, Rfl: 0    valsartan (DIOVAN) 160 MG tablet, Take 1 tablet (160 mg total) by mouth once daily., Disp: 90 tablet, Rfl: 0     Review of Systems:  Review of Systems   Constitutional: Negative for fever and weight loss.   HENT: Negative for ear pain and tinnitus.    Eyes: Positive for pain and redness.   Respiratory: Negative for cough and shortness of breath.    Cardiovascular: Negative for chest pain and palpitations.   Gastrointestinal: Positive for abdominal pain and constipation. Negative for heartburn.   Genitourinary: Positive for frequency.        Denies urinary incontinence. Denies urine retention.    Musculoskeletal: Positive for back pain. Negative for neck pain.   Skin: Negative for itching and rash.   Neurological: Positive for dizziness, weakness and headaches. Negative for tingling and seizures.   Endo/Heme/Allergies: Negative for environmental allergies. Does not bruise/bleed easily.   Psychiatric/Behavioral:  Negative for depression. The patient has insomnia. The patient is not nervous/anxious.        Allergies:  Patient has no known allergies.     Medical History:   has a past medical history of Cataract, Coronary artery disease, Diabetes mellitus, type 2, Hyperlipidemia, and Hypertension.    Surgical History:   has a past surgical history that includes Coronary artery bypass graft; Cataract extraction, bilateral; and Cataract extraction.    Family History:  family history includes Liver disease in his sister.    Social History:   reports that he has never smoked. He has never used smokeless tobacco. He reports previous alcohol use. He reports previous drug use.    Physical Exam:  /83   Pulse 91   GEN: No acute distress. Calm, comfortable  HENT: Normocephalic, atraumatic, moist mucous membranes  EYE: Anicteric sclera, non-injected  CV: Non-diaphoretic.  CHEST: Breathing comfortably. Chest expansion symmetric  EXT: No clubbing, cyanosis.   Psych: Mood and affect are appropriate  GAIT: Independent, normal ambulation      Imaging:  - MRI Lumbar Spine 3/18/20:  Grade 1 anterolisthesis L5 on S1 secondary to facet joint arthropathy.  There is scattered mild endplate degenerative change with degenerative disc disease most pronounced at L5/S1 with mild height loss.  The distal spinal cord and conus is normal in signal and contour tip of the conus approximates the inferior L1 level.  No aneurysmal dilatation of the visualized abdominal aorta.  Scattered partially visualized prominent foci periaortic and right retrocaval retroperitoneum suggestive for prominent retroperitoneal lymph nodes, largest measuring 11 mm in short access.  T12/L1 through L1/L2: No significant disc bulge, central canal or neural foraminal stenosis.  L2/L3: Bulging disc without significant central canal stenosis.  There is a joint arthropathy and mild bilateral neural foraminal stenosis.  L3/L4:No significant disc bulge or central canal stenosis.   There is facet joint arthropathy and mild bilateral foraminal stenosis.  L4/L5:Bulging disc with ligamentum flavum hypertrophy and facet joint arthropathy and superimposed epidural lipomatosis dorsally with moderate resulting central canal stenosis and bilateral neural foraminal stenosis.  L5/S1: Grade 1 anterolisthesis the with posterior disc osteophyte and ligamentum flavum hypertrophy and facet joint arthropathy without significant central canal stenosis with moderate to severe bilateral neural foraminal stenosis    - X-ray Lumbar Spine 3/10/20:  Vertebral bodies are intact.  L5-S1 disc space is mildly narrowed.  Mild degenerative changes are seen around the facet joints.  No collapse or destruction is noted.  No instability in flexion and extension    - CT Abd Pelv w/ contrast 1/9/20:  1. No acute abnormalities in the abdomen or pelvis.  2. Right lower lobe pulmonary nodule measuring 0.9 cm.  Per Fleischner Society 2017 guidelines, recommend follow-up CT chest in 3 months.  3. Many borderline enlarged retroperitoneal nodes and a mildly enlarged portacaval lymph node.  These are nonspecific.  Consider short-term follow-up in 3 months.  4. Punctate calcifications probably within a gastrohepatic lymph node and adjacent to the esophagus in the lower mediastinum suggests prior granulomatous disease.  5. Cholelithiasis.    Labs:  BMP  Lab Results   Component Value Date     06/08/2020    K 4.2 06/08/2020     06/08/2020    CO2 27 06/08/2020    BUN 21 06/08/2020    CREATININE 1.7 (H) 06/08/2020    CALCIUM 9.4 06/08/2020    ANIONGAP 7 (L) 06/08/2020    ESTGFRAFRICA 45 (A) 06/08/2020    EGFRNONAA 39 (A) 06/08/2020     Lab Results   Component Value Date    ALT 22 06/08/2020    AST 19 06/08/2020    ALKPHOS 80 06/08/2020    BILITOT 0.5 06/08/2020     Lab Results   Component Value Date     06/08/2020     Lab Results   Component Value Date    LABA1C 9.0 (A) 11/12/2019    HGBA1C 8.6 (H) 06/08/2020      Assessment:  Joann Adrian is a 74 y.o. male with the following diagnoses based on history, exam, and imaging:    Problem List Items Addressed This Visit     Myofascial pain    Left upper quadrant pain - Primary    Neuropathic pain        This is a pleasant 74 y.o. gentleman with CAD, hypertension, CKD, DM2, GERD,   Right Head/Facial Pain: Appears to be due to postherpetic neuralgia with history of shingles in the area.  Abdominal Pain: He does have a ventral hernia on exam. He also has TTP in LUQ and not around the hernia, which may be myofascial, as it does not appear visceral in nature. Abd US did not assess midline, so may repeat in future.   Back pain appears multifactorial. 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     6/22/20 - Again, this patient was evaluated and treated as cross coverage for Dr. Davis while he is out.  Patient was advised that his DDx includes (1) Post-herpetic neuralgia of the left flank, (2) Myofascial pain, (3) Intercostal neuritis, and (4) Thoracic Radiculopathy.  We will start treatment with a local TPI today which would have some effect of myofascial pain and possibly intercostal neuralgia depending on needle placement.  I suspect that his ultimate diagnosis will be post-herpetic neuralgia given the symptoms and presentation.  Should the TPI today fail to provide relief, we may consider paraspinal block vs interlaminar KELSIE in the mid thoracic spine, though this will ultimately be deferred to Dr. Davis.    Treatment Plan:   - PT/OT/HEP: Patient just finished PT. Encouraged daily light aerobic exercise  - Procedures:    - TPI today in clinic with U/C guidance   - Consider right L4 & L5 TF KELSIE in the future   - Consider left T7/8 paraspinal block vs T7/8 IESI  - Medications:    - Not able to tolerate higher dose of gabapentin so back at 300 mg qHS.    - Recommend salon pas patches for PHN facial pain.   -  Imaging: Reviewed. May repeat ABD US to assess ventral hernia in the future  - Labs: Reviewed.  Current GFR = 39 based off of Cockcroft-Gault equation. Medications are appropriately dosed for current hepatorenal function.    Follow Up: RTC in 1-2 weeks for f/u p TPI    Grey Godfrey Jr, MD  Interventional Pain Medicine / Anesthesiology    Disclaimer: This note was partly generated using dictation software which may occasionally result in transcription errors.      Procedure Note    Procedure: Trigger point injection  Procedure Date: 06/22/2020  Muscles Injected: left intercostal, left external oblique, left internal oblique  Subjective: Multiple trigger points and areas of tenderness were identified and marked.  Preoperative Diagnosis: Myofascial Pain   Post Procedure Diagnosis: Myofascial Pain   Findings: Radiating trigger points  Complications: None  Anesthetic: Kenalog 40 mg (1 mL) + Bupivacaine 0.25% (4 mL), 0.5- 1.0 ml per site    Procedure details: Targeted trigger points were identified and marked with surgical marker.  Skin overlying target injection sites cleaned with alcohol swabs.  Ultrasound was used to visualize tissues in the targeted area given the proximity to the thoracic wall.  Under direct U/S guidance, the needle was advanced through the skin until contact with made at the underlying rib at that level.  The needle was withdrawn slightly.  After heme negative aspiration, 2 cc of injectate was administered.  The needle was then redirected slightly caudad, taking care not to penetrate the intercostal fascia.  An additional 1.5 mL of injectate was administered.  The needle was then withdrawn and redirected cephalad or an additional 0.5 mL of injectate was administered taking care not to penetrate the intercostal fascia.     Total injections: 3    Dispo: The procedure was well tolerated and patient will follow up as stated above.    Grey Godfrey Jr, MD  Interventional Pain Medicine  / Anesthesiology

## 2020-06-26 ENCOUNTER — TELEPHONE (OUTPATIENT)
Dept: ENDOCRINOLOGY | Facility: CLINIC | Age: 75
End: 2020-06-26

## 2020-06-26 DIAGNOSIS — E11.65 TYPE 2 DIABETES MELLITUS WITH HYPERGLYCEMIA, WITHOUT LONG-TERM CURRENT USE OF INSULIN: ICD-10-CM

## 2020-06-26 NOTE — TELEPHONE ENCOUNTER
----- Message from Yen Charles sent at 6/26/2020 11:32 AM CDT -----  Hi, That is correct. If the patient has private/commercial insurance he is eligible for the $25 copay card. Can you send the script to [facility] in [place]? I'll have them process it      Yen  ----- Message -----  From: Curt Kumar MD  Sent: 6/25/2020   5:37 PM CDT  To: Pharmacy Patient Assistance Team    Good evening,    This patient has commercial blue cross insurance (not medicare) and I am trying to get him a discount on Trulicity. From what I understood, the $25/month copay card could be used since he has commercial insurance, but his pharmacy is telling him he can't use it. I would like to see if he indeed does qualify for that or not.    Thanks!

## 2020-06-29 ENCOUNTER — TELEPHONE (OUTPATIENT)
Dept: PAIN MEDICINE | Facility: CLINIC | Age: 75
End: 2020-06-29

## 2020-06-29 ENCOUNTER — PATIENT OUTREACH (OUTPATIENT)
Dept: ADMINISTRATIVE | Facility: OTHER | Age: 75
End: 2020-06-29

## 2020-06-29 DIAGNOSIS — M54.14 THORACIC RADICULOPATHY: Primary | ICD-10-CM

## 2020-06-29 NOTE — TELEPHONE ENCOUNTER
Pt scheduled for 7/15/20 at 0945 am for Thoracic KELSIE. Pt aware to check in at registration desk on the first floor of the hospital for 0845 am. Pt is taking ASA and aware to hold 3 days prior to procedure.

## 2020-06-29 NOTE — PROGRESS NOTES
Requested updates within Care Everywhere.  Patient's chart was reviewed for overdue JACINTA topics.  Immunizations reconciled.

## 2020-06-30 ENCOUNTER — PATIENT MESSAGE (OUTPATIENT)
Dept: FAMILY MEDICINE | Facility: CLINIC | Age: 75
End: 2020-06-30

## 2020-06-30 ENCOUNTER — OFFICE VISIT (OUTPATIENT)
Dept: GASTROENTEROLOGY | Facility: CLINIC | Age: 75
End: 2020-06-30
Payer: COMMERCIAL

## 2020-06-30 VITALS — BODY MASS INDEX: 28.93 KG/M2 | HEIGHT: 66 IN | WEIGHT: 180 LBS

## 2020-06-30 DIAGNOSIS — R07.81 COSTAL MARGIN PAIN: Primary | ICD-10-CM

## 2020-06-30 DIAGNOSIS — R10.9 ABDOMINAL PAIN, UNSPECIFIED ABDOMINAL LOCATION: ICD-10-CM

## 2020-06-30 PROCEDURE — 99999 PR PBB SHADOW E&M-EST. PATIENT-LVL III: ICD-10-PCS | Mod: PBBFAC,,, | Performed by: INTERNAL MEDICINE

## 2020-06-30 PROCEDURE — 99999 PR PBB SHADOW E&M-EST. PATIENT-LVL III: CPT | Mod: PBBFAC,,, | Performed by: INTERNAL MEDICINE

## 2020-06-30 PROCEDURE — 99213 PR OFFICE/OUTPT VISIT, EST, LEVL III, 20-29 MIN: ICD-10-PCS | Mod: S$GLB,,, | Performed by: INTERNAL MEDICINE

## 2020-06-30 PROCEDURE — 99213 OFFICE O/P EST LOW 20 MIN: CPT | Mod: S$GLB,,, | Performed by: INTERNAL MEDICINE

## 2020-06-30 NOTE — LETTER
June 30, 2020      Karel Matthew, DO  [address] [facility] [place]  [place] [address]           [place] [facility]  [address], [address]  [place] [address]  Phone: [phone]          Patient: Joann Adrian   MR Number: [id number]   YOB: 1945   Date of Visit: 6/30/2020       Dear Dr. Karel Matthew:    Thank you for referring Joann Adrian to me for evaluation. Attached you will find relevant portions of my assessment and plan of care.    If you have questions, please do not hesitate to call me. I look forward to following Joann Adrian along with you.    Sincerely,    Reji Quintero MD    Enclosure  CC:  Sera Davis MD    If you would like to receive this communication electronically, please contact [email or web address] or [phone] to request more information on [employer] access.    For providers and/or their staff who would like to refer a patient to [facility], please contact us through our one-stop-shop provider referral line, [facility], at [phone].    If you feel you have received this communication in error or would no longer like to receive these types of communications, please e-mail [email or web address]

## 2020-06-30 NOTE — PROGRESS NOTES
GASTROENTEROLOGY CLINIC NOTE    Reason for visit: The primary encounter diagnosis was Costal margin pain. A diagnosis of Abdominal pain, unspecified abdominal location was also pertinent to this visit.  Referring provider/PCP: Karel Matthew DO    HPI:  Joann Adrian is a 74 y.o. male here today for eval of luq pain, follow up visit.   accompanied by his son who provides translation and history. Declined translation services offering.  Hx of DM, CAD. On ASA daily    Interval:  Still with left flank pain. Worse on light palpation. Does not feel deep. Feels superficial. No rash. No vomiting.   Has seen pain mngmt dr. griffin and dr. Davis.  Not much improvement so far but has another appt 7/15.    =====================================  Initial HPI:  Over the past 8 months or so patient has experienced left upper quadrant pain.  This is in the subcostal area on the left side.  There is some movement of the pain down to the mid abdomen.  There is no radiation of the pain to the back.  There is no relation with bowel habits.  He is having fairly regular bowel movements every day or twice a day.  He does not get any relief with having a bowel movement.  There is no blood in the stool.  There is no change in the pain with oral intake.  He is eating a regular diet and not having any issues.  There is no vomiting.  There is no weight loss.    Following with PCP who has started DM and statin medication, about 3 months ago.  Referral in to pain med for possible nerve entrapement, requested to be seen in GI first.      Prior Endoscopy:  EGD: / Colon: none    (Portions of this note were dictated using voice recognition software and may contain dictation related errors in spelling/grammar/syntax not found on text review)    Review of Systems   Constitutional: Negative for fever and weight loss.   Respiratory: Negative for cough and shortness of breath.    Cardiovascular: Negative for chest pain and leg swelling.    Gastrointestinal: Positive for abdominal pain. Negative for blood in stool and vomiting.   Genitourinary: Positive for flank pain. Negative for dysuria and hematuria.   Musculoskeletal: Negative for joint pain and neck pain.   Skin: Negative for itching and rash.   Neurological: Negative for dizziness and headaches.       Past Medical History: has a past medical history of Cataract, Coronary artery disease, Diabetes mellitus, type 2, Hyperlipidemia, and Hypertension.    Past Surgical History: has a past surgical history that includes Coronary artery bypass graft; Cataract extraction, bilateral; and Cataract extraction.    Family History:family history includes Liver disease in his sister.    Allergies: Review of patient's allergies indicates:  No Known Allergies    Social History: reports that he has never smoked. He has never used smokeless tobacco. He reports previous alcohol use. He reports previous drug use.    Home medications:   Current Outpatient Medications on File Prior to Visit   Medication Sig Dispense Refill    amLODIPine (NORVASC) 10 MG tablet Take 1 tablet (10 mg total) by mouth once daily. 90 tablet 0    aspirin 81 MG Chew Take 1 tablet (81 mg total) by mouth once daily.      atorvastatin (LIPITOR) 40 MG tablet Take 1 tablet (40 mg total) by mouth once daily. 90 tablet 0    bisoprolol (ZEBETA) 5 MG tablet Take 1 tablet (5 mg total) by mouth once daily. 90 tablet 0    dulaglutide (TRULICITY) 0.75 mg/0.5 mL PnIj Inject 0.5 mLs (0.75 mg total) into the skin once a week. 2 mL 11    gabapentin (NEURONTIN) 300 MG capsule Take 1 capsule (300 mg total) by mouth 2 (two) times daily. 180 capsule 0    glimepiride (AMARYL) 4 MG tablet Take 1 tablet (4 mg total) by mouth daily with breakfast. 90 tablet 0    metFORMIN (GLUCOPHAGE) 500 MG tablet Take 1 tablet (500 mg total) by mouth 2 (two) times daily with meals. 180 tablet 4    multivitamin (ONE DAILY MULTIVITAMIN) per tablet Take 1 tablet by mouth once  "daily.      omeprazole (PRILOSEC) 20 MG capsule Take 1 capsule (20 mg total) by mouth every morning. Take in place of 40 mg 90 capsule 0    valsartan (DIOVAN) 160 MG tablet Take 1 tablet (160 mg total) by mouth once daily. 90 tablet 0     No current facility-administered medications on file prior to visit.        Vital signs:  Ht 5' 6" (1.676 m)   Wt 81.6 kg (180 lb)   BMI 29.05 kg/m²     Physical Exam  Vitals signs reviewed.   Constitutional:       Appearance: He is not toxic-appearing.   HENT:      Head: Normocephalic and atraumatic.   Eyes:      General: No scleral icterus.     Conjunctiva/sclera: Conjunctivae normal.   Abdominal:      General: Bowel sounds are normal.      Palpations: Abdomen is soft. There is no mass.      Comments: tehre is increased tenderness on superficial palpation and light stroking of the skin over left lateral abd / left flank.  No skin chagnes. No tenderness on deep palpation.   Neurological:      Mental Status: He is alert.   Psychiatric:         Mood and Affect: Mood normal.         Behavior: Behavior normal.         Routine labs:  Lab Results   Component Value Date    WBC 6.51 06/08/2020    HGB 14.3 06/08/2020    HCT 44.3 06/08/2020    MCV 83 06/08/2020     06/08/2020     No results found for: INR  No results found for: IRON, FERRITIN, TIBC, FESATURATED  Lab Results   Component Value Date     06/08/2020    K 4.2 06/08/2020     06/08/2020    CO2 27 06/08/2020    BUN 21 06/08/2020    CREATININE 1.7 (H) 06/08/2020     Lab Results   Component Value Date    ALBUMIN 3.6 06/08/2020    ALT 22 06/08/2020    AST 19 06/08/2020    ALKPHOS 80 06/08/2020    BILITOT 0.5 06/08/2020     No results found for: GLUCOSE    I have reviewed prior labs, imaging, notes from last month  Reviewed CT from beginning January 1. No acute abnormalities in the abdomen or pelvis.  2. Right lower lobe pulmonary nodule measuring 0.9 cm.  Per Fleischner Society 2017 guidelines, recommend " follow-up CT chest in 3 months.  3. Many borderline enlarged retroperitoneal nodes and a mildly enlarged portacaval lymph node.  These are nonspecific.  Consider short-term follow-up in 3 months.  4. Punctate calcifications probably within a gastrohepatic lymph node and adjacent to the esophagus in the lower mediastinum suggests prior granulomatous disease.  5. Cholelithiasis.  Also noted is left inguinal hernia.    Assessment:  1. Costal margin pain    2. Abdominal pain, unspecified abdominal location      Still suspect superficial etiology of pain.  However we did discuss that if he continues to have symptoms and no resolution with the visits with the pain management clinic, then the next step will be to proceed with an upper and lower endoscopy to ensure no luminal cause of his symptoms.      Plan:     Follow up as scheduled with Dr. Davis   - will cc Dr. Davis to this note.    IF still symptomatic after dr. davis visit and no improvement, the next step is EGD + colonoscopy and I discussed this with son and patient.    RTC prn, they will let me know his progress and if they wish to proceed after their dr. davis visit.      Reji Quintero MD  [facility] [place]

## 2020-07-01 ENCOUNTER — TELEPHONE (OUTPATIENT)
Dept: PHARMACY | Facility: CLINIC | Age: 75
End: 2020-07-01

## 2020-07-01 NOTE — TELEPHONE ENCOUNTER
----- Message from Curt Kumar MD sent at 6/25/2020  5:37 PM CDT -----  Good evening,    This patient has commercial blue cross insurance (not medicare) and I am trying to get him a discount on Trulicity. From what I understood, the $25/month copay card could be used since he has commercial insurance, but his pharmacy is telling him he can't use it. I would like to see if he indeed does qualify for that or not.    Thanks!

## 2020-07-01 NOTE — TELEPHONE ENCOUNTER
[facility]. He was unable to use the card because the script is to soon pharmacy will re-run it on 07/08

## 2020-07-13 DIAGNOSIS — E11.65 TYPE 2 DIABETES MELLITUS WITH HYPERGLYCEMIA, WITHOUT LONG-TERM CURRENT USE OF INSULIN: Primary | ICD-10-CM

## 2020-07-13 RX ORDER — PIOGLITAZONEHYDROCHLORIDE 15 MG/1
15 TABLET ORAL DAILY
Qty: 90 TABLET | Refills: 3 | Status: SHIPPED | OUTPATIENT
Start: 2020-07-13 | End: 2020-09-21 | Stop reason: SINTOL

## 2020-07-13 NOTE — PROGRESS NOTES
Patient not able to afford Trulicity due to having a high deductible plan.  Discussed alternatives with his son who helps make medical decisions.   We will try Actos 15 mg daily - discussed side-effects and rare potential link to bladder cancer.

## 2020-07-15 ENCOUNTER — HOSPITAL ENCOUNTER (OUTPATIENT)
Facility: HOSPITAL | Age: 75
Discharge: HOME | End: 2020-07-15
Attending: PHYSICAL MEDICINE & REHABILITATION | Admitting: ANESTHESIOLOGY
Payer: COMMERCIAL

## 2020-07-15 VITALS
TEMPERATURE: 98 F | OXYGEN SATURATION: 99 % | HEART RATE: 74 BPM | DIASTOLIC BLOOD PRESSURE: 91 MMHG | RESPIRATION RATE: 16 BRPM | SYSTOLIC BLOOD PRESSURE: 161 MMHG

## 2020-07-15 DIAGNOSIS — G89.29 CHRONIC PAIN: ICD-10-CM

## 2020-07-15 DIAGNOSIS — M54.14 THORACIC RADICULITIS: Primary | ICD-10-CM

## 2020-07-15 LAB — POCT GLUCOSE: 146 MG/DL (ref 70–110)

## 2020-07-15 PROCEDURE — 62321 PR INJ CERV/THORAC, W/GUIDANCE: ICD-10-PCS | Mod: ,,, | Performed by: PHYSICAL MEDICINE & REHABILITATION

## 2020-07-15 PROCEDURE — 63600175 PHARM REV CODE 636 W HCPCS: Performed by: PHYSICAL MEDICINE & REHABILITATION

## 2020-07-15 PROCEDURE — 62321 NJX INTERLAMINAR CRV/THRC: CPT | Mod: ,,, | Performed by: PHYSICAL MEDICINE & REHABILITATION

## 2020-07-15 PROCEDURE — 62321 NJX INTERLAMINAR CRV/THRC: CPT | Performed by: PHYSICAL MEDICINE & REHABILITATION

## 2020-07-15 PROCEDURE — 25000003 PHARM REV CODE 250: Performed by: PHYSICAL MEDICINE & REHABILITATION

## 2020-07-15 PROCEDURE — 25500020 PHARM REV CODE 255: Performed by: PHYSICAL MEDICINE & REHABILITATION

## 2020-07-15 RX ORDER — SODIUM CHLORIDE 9 MG/ML
500 INJECTION, SOLUTION INTRAVENOUS CONTINUOUS
Status: DISCONTINUED | OUTPATIENT
Start: 2020-07-15 | End: 2020-07-15 | Stop reason: HOSPADM

## 2020-07-15 RX ORDER — LIDOCAINE HYDROCHLORIDE 10 MG/ML
INJECTION INFILTRATION; PERINEURAL
Status: DISCONTINUED | OUTPATIENT
Start: 2020-07-15 | End: 2020-07-15 | Stop reason: HOSPADM

## 2020-07-15 RX ORDER — LIDOCAINE HYDROCHLORIDE 10 MG/ML
INJECTION, SOLUTION EPIDURAL; INFILTRATION; INTRACAUDAL; PERINEURAL
Status: DISCONTINUED | OUTPATIENT
Start: 2020-07-15 | End: 2020-07-15 | Stop reason: HOSPADM

## 2020-07-15 RX ORDER — SODIUM BICARBONATE 1 MEQ/ML
SYRINGE (ML) INTRAVENOUS
Status: DISCONTINUED | OUTPATIENT
Start: 2020-07-15 | End: 2020-07-15 | Stop reason: HOSPADM

## 2020-07-15 RX ORDER — DEXAMETHASONE SODIUM PHOSPHATE 10 MG/ML
INJECTION INTRAMUSCULAR; INTRAVENOUS
Status: DISCONTINUED | OUTPATIENT
Start: 2020-07-15 | End: 2020-07-15 | Stop reason: HOSPADM

## 2020-07-15 NOTE — OP NOTE
[facility]  Discharge Note  Short Stay     Admit Date: 7/15/2020    Discharge Date: 7/15/2020     Attending Physician: Sera Davis M.D.    Diagnoses:  Active Hospital Problems    Diagnosis  POA    Thoracic radiculopathy [M54.14]  Yes      Resolved Hospital Problems   No resolved problems to display.     Discharged Condition: Good     Hospital Course: Patient was admitted for an outpatient interventional pain management procedure and tolerated the procedure well with no complications.     Final Diagnoses: Same as principal problem.     Disposition: Home or Self Care     Follow up/Patient Instructions:        Reconciled Medications:     Medication List      CONTINUE taking these medications    amLODIPine 10 MG tablet  Commonly known as: NORVASC  Take 1 tablet (10 mg total) by mouth once daily.     atorvastatin 40 MG tablet  Commonly known as: LIPITOR  Take 1 tablet (40 mg total) by mouth once daily.     bisoprolol 5 MG tablet  Commonly known as: ZEBETA  Take 1 tablet (5 mg total) by mouth once daily.     gabapentin 300 MG capsule  Commonly known as: NEURONTIN  Take 1 capsule (300 mg total) by mouth 2 (two) times daily.     glimepiride 4 MG tablet  Commonly known as: AMARYL  Take 1 tablet (4 mg total) by mouth daily with breakfast.     metFORMIN 500 MG tablet  Commonly known as: GLUCOPHAGE  Take 1 tablet (500 mg total) by mouth 2 (two) times daily with meals.     omeprazole 20 MG capsule  Commonly known as: PRILOSEC  Take 1 capsule (20 mg total) by mouth every morning. Take in place of 40 mg     ONE DAILY MULTIVITAMIN per tablet  Generic drug: multivitamin  Take 1 tablet by mouth once daily.     pioglitazone 15 MG tablet  Commonly known as: ACTOS  Take 1 tablet (15 mg total) by mouth once daily.     TRULICITY 0.75 mg/0.5 mL pen injector  Generic drug: dulaglutide  Inject 0.5 mLs (0.75 mg total) into the skin once a week.     valsartan 160 MG tablet  Commonly known as: DIOVAN  Take 1 tablet (160 mg total)  by mouth once daily.        ASK your doctor about these medications    aspirin 81 MG Chew  Take 1 tablet (81 mg total) by mouth once daily.          No discharge procedures on file.    Sera Davis M.D.  Interventional Pain Medicine / Physical Medicine & Rehabilitation

## 2020-07-15 NOTE — OP NOTE
"Patient Name: Joann Adrian  MRN: [id number]    INFORMED CONSENT: The procedure, risks, benefits and options were discussed with patient. There are no contraindications to the procedure. The patient expressed understanding and agreed to proceed. The personnel performing the procedure was discussed. I verify that I personally obtained Joann's consent prior to the start of the procedure and the signed consent can be found on the patient's chart.    Procedure Date: 07/15/2020    Anesthesia: Topical    Pre Procedure diagnosis: Thoracic radiculopathy [M54.14]  1. Thoracic radiculitis    2. Chronic pain      Post-Procedure diagnosis: SAME      Sedation: None    PROCEDURE: T7-8 THORACIC EPIDURAL STEROID INJECTION  DESCRIPTION OF PROCEDURE: The patient was brought to the fluoroscopy suite. After performing time out  IV access was obtained prior to the procedure. The patient was positioned prone on the fluoroscopy table. Continuous hemodynamic monitoring was initiated including blood pressure, EKG, and pulse oximetry. The area of the thoracic spine was prepped with chlorhexidine three times and draped in a sterile fashion. Skin anesthesia was achieved using 3 mL of lidocaine 1% over the respective injection site. The T7-8 interspace was visualized under fluoroscopic imaging. An 20 gauge 4 1/2" Tuohy needle was slowly inserted and advanced using loss of resistance to saline with AP and lateral fluoroscopic imaging for needle guidance. Negative aspiration for blood or CSF was confirmed. Epidural contrast spread was confirmed using 2 mL of Omnipaque 300 contrast. 4 mL of lidocaine 1% and 10 mg decadron was injected. The needle was removed and bleeding was nil.  A sterile dressing was applied. No specimens collected. PATIENT was taken to the Post-block Recovery Area for further observation.   Blood Loss: Nill  Specimen: None    Sera Davis MD    "

## 2020-07-15 NOTE — PLAN OF CARE
POC updated and reviewed with pt and pt's family. Pt and pt's family verbalize understanding. Safety precautions maintained. Bed locked and in lowest position. Instructed pt to call for assistance. Pt verbalizes understanding.

## 2020-07-15 NOTE — PLAN OF CARE
Dr. Davis @  speaking with patient and his son. Notified patient ate at 630 am. States we can still proceed without sedation. Son and patient updated by Dr. Davis. Consent obtained by Dr. Davis.

## 2020-07-15 NOTE — DISCHARGE INSTRUCTIONS
Home Care Instructions Pain Management:    1.  DIET:    You may resume your normal diet today.    2.  BATHING:    You may shower with luke warm water.    3.  DRESSING:    You may remove your bandage today.    4.  ACTIVITY LEVEL:      You may resume your normal activities 24 hours after your procedure.    5.  MEDICATIONS:    You may resume your normal medications today.    6.  SPECIAL INSTRUCTIONS:    No heat to the injection site for 24 hours including bath or shower, heating pad, moist heat or hot tubs.    Use an ice pack to the injection site for any pain or discomfort.  Apply ice packs for 20 minute intervals as needed.    If you have received any sedatives by mouth today, you can not drive for 12 hours.    If you have received sedation through an IV, you can not drive for 24 hours.    PLEASE CALL YOUR DOCTOR FOR THE FOLLOWIN.  Redness or swelling around the injection site.  2.  Fever of 101 degrees.  3.  Drainage (pus) from the injection site.  4.  For any continuous bleeding (some dried blood over the incision is normal.)    FOR EMERGENCIES:    If any unusual problems or difficulties occur during clinic hours, call [phone] or dial [phone].    Follow up with with your physician in 2-3 weeks.

## 2020-08-07 ENCOUNTER — PATIENT OUTREACH (OUTPATIENT)
Dept: ADMINISTRATIVE | Facility: OTHER | Age: 75
End: 2020-08-07

## 2020-08-10 ENCOUNTER — OFFICE VISIT (OUTPATIENT)
Dept: OPHTHALMOLOGY | Facility: CLINIC | Age: 75
End: 2020-08-10
Payer: COMMERCIAL

## 2020-08-10 DIAGNOSIS — H26.493 BILATERAL POSTERIOR CAPSULAR OPACIFICATION: Primary | ICD-10-CM

## 2020-08-10 DIAGNOSIS — Z96.1 PSEUDOPHAKIA: ICD-10-CM

## 2020-08-10 DIAGNOSIS — H52.7 REFRACTIVE ERROR: ICD-10-CM

## 2020-08-10 DIAGNOSIS — I10 ESSENTIAL HYPERTENSION: ICD-10-CM

## 2020-08-10 DIAGNOSIS — E11.9 DM TYPE 2 WITHOUT RETINOPATHY: ICD-10-CM

## 2020-08-10 LAB
LEFT EYE DM RETINOPATHY: NEGATIVE
RIGHT EYE DM RETINOPATHY: NORMAL

## 2020-08-10 PROCEDURE — 66821 PR DISCISSION,2ND CATARACT,LASER: ICD-10-PCS | Mod: LT,S$GLB,, | Performed by: OPHTHALMOLOGY

## 2020-08-10 PROCEDURE — 99999 PR PBB SHADOW E&M-EST. PATIENT-LVL III: CPT | Mod: PBBFAC,,, | Performed by: OPHTHALMOLOGY

## 2020-08-10 PROCEDURE — 99999 PR PBB SHADOW E&M-EST. PATIENT-LVL III: ICD-10-PCS | Mod: PBBFAC,,, | Performed by: OPHTHALMOLOGY

## 2020-08-10 PROCEDURE — 92012 INTRM OPH EXAM EST PATIENT: CPT | Mod: 57,S$GLB,, | Performed by: OPHTHALMOLOGY

## 2020-08-10 PROCEDURE — 92012 PR EYE EXAM, EST PATIENT,INTERMED: ICD-10-PCS | Mod: 57,S$GLB,, | Performed by: OPHTHALMOLOGY

## 2020-08-10 PROCEDURE — 66821 AFTER CATARACT LASER SURGERY: CPT | Mod: LT,S$GLB,, | Performed by: OPHTHALMOLOGY

## 2020-08-10 NOTE — LETTER
August 10, 2020      Bhavana Sheriff, OD  [address]  [facility] [address]           [facility]  [address] [facility]  [place] [address]  Phone: [phone]  Fax: [phone]          Patient: Joann Adrian   MR Number: [id number]   YOB: 1945   Date of Visit: 8/10/2020       Dear Dr. Bhavana Sheriff:    Thank you for referring Joann Adrian to me for evaluation. Attached you will find relevant portions of my assessment and plan of care.    If you have questions, please do not hesitate to call me. I look forward to following Joann Adrian along with you.    Sincerely,    Parth Spencer MD    Enclosure  CC:  No Recipients    If you would like to receive this communication electronically, please contact [email or web address] or [phone] to request more information on [employer] access.    For providers and/or their staff who would like to refer a patient to [facility], please contact us through our one-stop-shop provider referral line, [facility], at [phone].    If you feel you have received this communication in error or would no longer like to receive these types of communications, please e-mail [email or web address]

## 2020-08-10 NOTE — PROGRESS NOTES
Subjective:       Patient ID: Joann Adrian is a 74 y.o. male.    Chief Complaint: Posterior Capsular Opacification    HPI     74 y.o. male is here for DM Check. Last glucose reading was yesterday   morning 145. Denies eye and f/f. Blurred vision at distance and near. No   discomfort. Do have trouble with glare.     Eye Med's: No gtt     Last edited by ADRIAN Cervantes on 8/10/2020  1:34 PM. (History)             Assessment:       1. Bilateral posterior capsular opacification    2. DM type 2 without retinopathy    3. Essential hypertension    4. Refractive error    5. Pseudophakia        Plan:       Visually significant  PCO OS>OD- Pt. Wants Laser.     DM-No NPDR OS.  HTN-No retinopathy OS.  RE      YAG CAP left eye (OS) today. TE=   65.8 mj, Avg. 0.7 mj, 94 pulses.  PF taper OS.  Control HTN & DM.  RTC 2-3 wks.    YAG laser Capsulotomy Procedure Note:  Informed consent was obtained and correct eye was verified with patient.   One drop of topical Proparacaine 1% was instilled.  YAG laser applied to posterior capsule in cruciate pattern.  One drop of Apraclonidine 0.5% and 1 drop of Pred Acetate 1% applied to eye after laser.  Pt tolerated procedure well. No complications.  Follow up in 2-3 weeks.

## 2020-08-31 ENCOUNTER — OFFICE VISIT (OUTPATIENT)
Dept: OPHTHALMOLOGY | Facility: CLINIC | Age: 75
End: 2020-08-31
Payer: COMMERCIAL

## 2020-08-31 DIAGNOSIS — H52.7 REFRACTIVE ERROR: ICD-10-CM

## 2020-08-31 DIAGNOSIS — Z96.1 PSEUDOPHAKIA: ICD-10-CM

## 2020-08-31 DIAGNOSIS — E11.9 DM TYPE 2 WITHOUT RETINOPATHY: ICD-10-CM

## 2020-08-31 DIAGNOSIS — H26.491 PCO (POSTERIOR CAPSULAR OPACIFICATION), RIGHT: ICD-10-CM

## 2020-08-31 DIAGNOSIS — Z98.890 POST-OPERATIVE STATE: Primary | ICD-10-CM

## 2020-08-31 PROCEDURE — 99999 PR PBB SHADOW E&M-EST. PATIENT-LVL III: CPT | Mod: PBBFAC,,, | Performed by: OPHTHALMOLOGY

## 2020-08-31 PROCEDURE — 99499 NO LOS: ICD-10-PCS | Mod: S$GLB,,, | Performed by: OPHTHALMOLOGY

## 2020-08-31 PROCEDURE — 99499 UNLISTED E&M SERVICE: CPT | Mod: S$GLB,,, | Performed by: OPHTHALMOLOGY

## 2020-08-31 PROCEDURE — 66821 PR DISCISSION,2ND CATARACT,LASER: ICD-10-PCS | Mod: 79,RT,S$GLB, | Performed by: OPHTHALMOLOGY

## 2020-08-31 PROCEDURE — 66821 AFTER CATARACT LASER SURGERY: CPT | Mod: 79,RT,S$GLB, | Performed by: OPHTHALMOLOGY

## 2020-08-31 PROCEDURE — 99999 PR PBB SHADOW E&M-EST. PATIENT-LVL III: ICD-10-PCS | Mod: PBBFAC,,, | Performed by: OPHTHALMOLOGY

## 2020-09-01 NOTE — PROGRESS NOTES
Subjective:       Patient ID: Joann Adrian is a 74 y.o. male.    Chief Complaint: YAG LASER    HPI     DLS:8/10/2020  Pt here today for POST YAG OS and YAG OD today. Pt states glare is a   bother and va is blurry OD.     Last edited by Jeana Jimenez on 8/31/2020  4:28 PM. (History)             Assessment:       1. Post-operative state    2. PCO (posterior capsular opacification), right    3. DM type 2 without retinopathy    4. Refractive error    5. Pseudophakia        Plan:       S/p YAG CAP OS- Doing well.     Visually significant  PCO OD-Pt wants laser tx today.  DM-No NPDR OD.  RE      YAG CAP right eye (OD) today. TE=   60.2 mj, Avg. 0.7 mj, 86 pulses.  PF taper OD.  Control DM.  RTC in 2-3 wks.    YAG laser Capsulotomy Procedure Note:  Informed consent was obtained and correct eye was verified with patient.   One drop of topical Proparacaine 1% was instilled.  YAG laser applied to posterior capsule in cruciate pattern.  One drop of Apraclonidine 0.5% and 1 drop of Pred Acetate 1% applied to eye after laser.  Pt tolerated procedure well. No complications.  Follow up in 2-3 weeks.

## 2020-09-14 ENCOUNTER — LAB VISIT (OUTPATIENT)
Dept: LAB | Facility: HOSPITAL | Age: 75
End: 2020-09-14
Attending: INTERNAL MEDICINE
Payer: COMMERCIAL

## 2020-09-14 ENCOUNTER — OFFICE VISIT (OUTPATIENT)
Dept: OPHTHALMOLOGY | Facility: CLINIC | Age: 75
End: 2020-09-14
Payer: COMMERCIAL

## 2020-09-14 DIAGNOSIS — Z96.1 PSEUDOPHAKIA: ICD-10-CM

## 2020-09-14 DIAGNOSIS — E11.65 TYPE 2 DIABETES MELLITUS WITH HYPERGLYCEMIA, WITHOUT LONG-TERM CURRENT USE OF INSULIN: ICD-10-CM

## 2020-09-14 DIAGNOSIS — H52.7 REFRACTIVE ERROR: ICD-10-CM

## 2020-09-14 DIAGNOSIS — Z98.890 POST-OPERATIVE STATE: Primary | ICD-10-CM

## 2020-09-14 DIAGNOSIS — E11.65 UNCONTROLLED TYPE 2 DIABETES MELLITUS WITH HYPERGLYCEMIA: ICD-10-CM

## 2020-09-14 LAB
ALBUMIN SERPL BCP-MCNC: 3.6 G/DL (ref 3.5–5.2)
ALP SERPL-CCNC: 74 U/L (ref 55–135)
ALT SERPL W/O P-5'-P-CCNC: 24 U/L (ref 10–44)
ANION GAP SERPL CALC-SCNC: 8 MMOL/L (ref 8–16)
AST SERPL-CCNC: 20 U/L (ref 10–40)
BILIRUB SERPL-MCNC: 0.4 MG/DL (ref 0.1–1)
BUN SERPL-MCNC: 38 MG/DL (ref 8–23)
CALCIUM SERPL-MCNC: 9.5 MG/DL (ref 8.7–10.5)
CHLORIDE SERPL-SCNC: 103 MMOL/L (ref 95–110)
CO2 SERPL-SCNC: 25 MMOL/L (ref 23–29)
CREAT SERPL-MCNC: 1.9 MG/DL (ref 0.5–1.4)
EST. GFR  (AFRICAN AMERICAN): 40 ML/MIN/1.73 M^2
EST. GFR  (NON AFRICAN AMERICAN): 34 ML/MIN/1.73 M^2
ESTIMATED AVG GLUCOSE: 203 MG/DL (ref 68–131)
GLUCOSE SERPL-MCNC: 208 MG/DL (ref 70–110)
HBA1C MFR BLD HPLC: 8.7 % (ref 4–5.6)
POTASSIUM SERPL-SCNC: 4.6 MMOL/L (ref 3.5–5.1)
PROT SERPL-MCNC: 7.8 G/DL (ref 6–8.4)
SODIUM SERPL-SCNC: 136 MMOL/L (ref 136–145)

## 2020-09-14 PROCEDURE — 80053 COMPREHEN METABOLIC PANEL: CPT

## 2020-09-14 PROCEDURE — 99999 PR PBB SHADOW E&M-EST. PATIENT-LVL III: CPT | Mod: PBBFAC,,, | Performed by: OPHTHALMOLOGY

## 2020-09-14 PROCEDURE — 36415 COLL VENOUS BLD VENIPUNCTURE: CPT

## 2020-09-14 PROCEDURE — 99999 PR PBB SHADOW E&M-EST. PATIENT-LVL III: ICD-10-PCS | Mod: PBBFAC,,, | Performed by: OPHTHALMOLOGY

## 2020-09-14 PROCEDURE — 99024 PR POST-OP FOLLOW-UP VISIT: ICD-10-PCS | Mod: S$GLB,,, | Performed by: OPHTHALMOLOGY

## 2020-09-14 PROCEDURE — 83036 HEMOGLOBIN GLYCOSYLATED A1C: CPT

## 2020-09-14 PROCEDURE — 99024 POSTOP FOLLOW-UP VISIT: CPT | Mod: S$GLB,,, | Performed by: OPHTHALMOLOGY

## 2020-09-14 NOTE — PROGRESS NOTES
Subjective:       Patient ID: Joann Adrian is a 74 y.o. male.    Chief Complaint: Post-op Evaluation    HPI     DSL_ 8/31/2020     75 y/o male is here for Yag Laser post op. Pt reports visual improved   significant after Laser. Pt c/o eyes has been red. BSL was 113 this   morning. Denies pain and discomfort.     Eyemeds  No gtts    Last edited by Brigid Hairston on 9/14/2020  9:16 AM. (History)             Assessment:       1. Post-operative state    2. Refractive error    3. Pseudophakia        Plan:       S/p YAG CAP OU-Doing well.     RE-Pt wants MRx.      Give MRx.  RTC Dr Díaz in 6 mos.

## 2020-09-20 ENCOUNTER — PATIENT OUTREACH (OUTPATIENT)
Dept: ADMINISTRATIVE | Facility: OTHER | Age: 75
End: 2020-09-20

## 2020-09-21 ENCOUNTER — PATIENT MESSAGE (OUTPATIENT)
Dept: ENDOCRINOLOGY | Facility: CLINIC | Age: 75
End: 2020-09-21

## 2020-09-21 ENCOUNTER — OFFICE VISIT (OUTPATIENT)
Dept: ENDOCRINOLOGY | Facility: CLINIC | Age: 75
End: 2020-09-21
Payer: COMMERCIAL

## 2020-09-21 VITALS
HEIGHT: 66 IN | RESPIRATION RATE: 18 BRPM | WEIGHT: 192 LBS | SYSTOLIC BLOOD PRESSURE: 130 MMHG | TEMPERATURE: 98 F | BODY MASS INDEX: 30.86 KG/M2 | OXYGEN SATURATION: 93 % | DIASTOLIC BLOOD PRESSURE: 76 MMHG | HEART RATE: 60 BPM

## 2020-09-21 DIAGNOSIS — E11.65 UNCONTROLLED TYPE 2 DIABETES MELLITUS WITH HYPERGLYCEMIA: Chronic | ICD-10-CM

## 2020-09-21 DIAGNOSIS — I25.10 CORONARY ARTERY DISEASE INVOLVING NATIVE CORONARY ARTERY OF NATIVE HEART WITHOUT ANGINA PECTORIS: ICD-10-CM

## 2020-09-21 DIAGNOSIS — E66.09 CLASS 1 OBESITY DUE TO EXCESS CALORIES WITH SERIOUS COMORBIDITY AND BODY MASS INDEX (BMI) OF 30.0 TO 30.9 IN ADULT: ICD-10-CM

## 2020-09-21 DIAGNOSIS — E11.65 TYPE 2 DIABETES MELLITUS WITH HYPERGLYCEMIA, WITHOUT LONG-TERM CURRENT USE OF INSULIN: Primary | ICD-10-CM

## 2020-09-21 DIAGNOSIS — N18.30 TYPE 2 DIABETES MELLITUS WITH STAGE 3 CHRONIC KIDNEY DISEASE, WITHOUT LONG-TERM CURRENT USE OF INSULIN: ICD-10-CM

## 2020-09-21 DIAGNOSIS — E11.22 TYPE 2 DIABETES MELLITUS WITH STAGE 3 CHRONIC KIDNEY DISEASE, WITHOUT LONG-TERM CURRENT USE OF INSULIN: ICD-10-CM

## 2020-09-21 PROCEDURE — 99214 PR OFFICE/OUTPT VISIT, EST, LEVL IV, 30-39 MIN: ICD-10-PCS | Mod: S$GLB,,, | Performed by: INTERNAL MEDICINE

## 2020-09-21 PROCEDURE — 1159F MED LIST DOCD IN RCRD: CPT | Mod: S$GLB,,, | Performed by: INTERNAL MEDICINE

## 2020-09-21 PROCEDURE — 3052F PR MOST RECENT HEMOGLOBIN A1C LEVEL 8.0 - < 9.0%: ICD-10-PCS | Mod: CPTII,S$GLB,, | Performed by: INTERNAL MEDICINE

## 2020-09-21 PROCEDURE — 1125F AMNT PAIN NOTED PAIN PRSNT: CPT | Mod: S$GLB,,, | Performed by: INTERNAL MEDICINE

## 2020-09-21 PROCEDURE — 3008F PR BODY MASS INDEX (BMI) DOCUMENTED: ICD-10-PCS | Mod: CPTII,S$GLB,, | Performed by: INTERNAL MEDICINE

## 2020-09-21 PROCEDURE — 3078F PR MOST RECENT DIASTOLIC BLOOD PRESSURE < 80 MM HG: ICD-10-PCS | Mod: CPTII,S$GLB,, | Performed by: INTERNAL MEDICINE

## 2020-09-21 PROCEDURE — 1101F PR PT FALLS ASSESS DOC 0-1 FALLS W/OUT INJ PAST YR: ICD-10-PCS | Mod: CPTII,S$GLB,, | Performed by: INTERNAL MEDICINE

## 2020-09-21 PROCEDURE — 3008F BODY MASS INDEX DOCD: CPT | Mod: CPTII,S$GLB,, | Performed by: INTERNAL MEDICINE

## 2020-09-21 PROCEDURE — 3075F SYST BP GE 130 - 139MM HG: CPT | Mod: CPTII,S$GLB,, | Performed by: INTERNAL MEDICINE

## 2020-09-21 PROCEDURE — 3052F HG A1C>EQUAL 8.0%<EQUAL 9.0%: CPT | Mod: CPTII,S$GLB,, | Performed by: INTERNAL MEDICINE

## 2020-09-21 PROCEDURE — 1125F PR PAIN SEVERITY QUANTIFIED, PAIN PRESENT: ICD-10-PCS | Mod: S$GLB,,, | Performed by: INTERNAL MEDICINE

## 2020-09-21 PROCEDURE — 99999 PR PBB SHADOW E&M-EST. PATIENT-LVL IV: CPT | Mod: PBBFAC,,, | Performed by: INTERNAL MEDICINE

## 2020-09-21 PROCEDURE — 3078F DIAST BP <80 MM HG: CPT | Mod: CPTII,S$GLB,, | Performed by: INTERNAL MEDICINE

## 2020-09-21 PROCEDURE — 99999 PR PBB SHADOW E&M-EST. PATIENT-LVL IV: ICD-10-PCS | Mod: PBBFAC,,, | Performed by: INTERNAL MEDICINE

## 2020-09-21 PROCEDURE — 1101F PT FALLS ASSESS-DOCD LE1/YR: CPT | Mod: CPTII,S$GLB,, | Performed by: INTERNAL MEDICINE

## 2020-09-21 PROCEDURE — 1159F PR MEDICATION LIST DOCUMENTED IN MEDICAL RECORD: ICD-10-PCS | Mod: S$GLB,,, | Performed by: INTERNAL MEDICINE

## 2020-09-21 PROCEDURE — 99214 OFFICE O/P EST MOD 30 MIN: CPT | Mod: S$GLB,,, | Performed by: INTERNAL MEDICINE

## 2020-09-21 PROCEDURE — 3075F PR MOST RECENT SYSTOLIC BLOOD PRESS GE 130-139MM HG: ICD-10-PCS | Mod: CPTII,S$GLB,, | Performed by: INTERNAL MEDICINE

## 2020-09-21 RX ORDER — FLASH GLUCOSE SENSOR
2 KIT MISCELLANEOUS
Qty: 2 KIT | Refills: 11 | Status: SHIPPED | OUTPATIENT
Start: 2020-09-21 | End: 2020-10-14

## 2020-09-21 NOTE — ASSESSMENT & PLAN NOTE
Reviewed goals of therapy are to get the best control we can without hypoglycemia    Options for medical management her very limited due to kidney function and financial issues.  I discussed options including determining the price for a DPP4 inhibitor or switching to basal bolus insulin.  I am afraid either of these options may be cost prohibitive.     I will send in a prescription for Januvia 50 mg daily.  I told him to let me know if it was too expensive.  If so, our next choice will be multiple daily injections basal/bolus or premixed 70/30 insulin.    Medication changes:   Start:   1. Januvia 50 mg daily (renally dosed)  Continue:    1. Glimepiride 4 mg daily  2. Metformin 500 mg BID (renally dosed)  Stop:  1. Actos 15 mg daily - significant leg edema even on low-dose     Advised frequent self blood glucose monitoring. Patient encouraged to document glucose results and bring them to every clinic visit. Logs provided. Advised to start checking BG during the day alternating between different meals and bedtime.  Sent a prescription for Freestyle [place] 14 day sensors.    Hypoglycemia precautions discussed. Getting him off glimepiride eventually would be preferable, but we have limited alternate options due to finances.      Close adherence to lifestyle changes recommended.      Eyes: UTD on eye exam.  Feet: UTD  Kidneys: UTD  HbA1c: Ordered for 3 months  Lipids: On statin  ASA: Yes    Lab Results   Component Value Date    HGBA1C 8.7 (H) 09/14/2020    HGBA1C 8.6 (H) 06/08/2020    HGBA1C 8.1 (H) 02/11/2020

## 2020-09-21 NOTE — PROGRESS NOTES
Subjective:      Chief Complaint: Referral for type 2 diabetes mellitus    HPI: Joann Adrian is a 74 y.o. male who is here for an initial evaluation for type 2 diabetes    Patient speaks some English, but his son was accompanying him to provide translation services. They did not want a 3rd party .     With regards to the diabetes:  The patient was initially diagnosed with Type 2 diabetes mellitus: for many years. Previously treated outside the [place].    Current symptoms/problems include bilateral ankle swelling, which started after starting Actos.  Last visit, we had tried to get him on Trulicity.  However, it was going to cost over 300 dollars per month, so he only bought one month of medication.  We tried to get him a cheaper price through pharmacy assistance, but we were unsuccessful.  He took Trulicity for about a month, and did report that his blood sugars were improving.     Known diabetic complications: nephropathy  Cardiovascular risk factors: advanced age (older than 55 for men, 65 for women), diabetes mellitus, dyslipidemia, hypertension, male gender, microalbuminuria, obesity (BMI >= 30 kg/m2) and history of CAD  Current diabetic medications include:   1. Metformin 500 mg BID (breakfast/supper)  2. Glimepiride 4 mg daily with breakfast  3. Actos 15 mg daily    Other medications tried:  1. Jardiance - too expensive on insurance plan  2. Trulicity - too expensive on insurance plan    Met with education?: Yes  Current diet: Plan to start decreasing portion size in effort to lose weight.  In his culture, his cuisine is fairly high in carbohydrates.  Current exercise: Walking twice daily.    Current monitoring regimen: home blood tests - 1 times daily  Self reported (his son thought the grabbed his glucose log, but they forgot it): fasting range: 120-140s  Any episodes of hypoglycemia? no    Diabetic Health Maintenance:          BP Readings from Last 3 Encounters:   09/21/20 130/76   07/15/20 (!)  161/91   06/22/20 134/83           Low dose ASA?: Yes        Glucagon emergency kit?: No        Medical alert tag?: No    Wt Readings from Last 10 Encounters:   09/21/20 87.1 kg (192 lb 0.3 oz)   06/30/20 81.6 kg (180 lb)   06/18/20 84.8 kg (186 lb 13.4 oz)   06/16/20 84 kg (185 lb 3 oz)   06/04/20 80.3 kg (177 lb)   04/23/20 80.5 kg (177 lb 7.5 oz)   03/12/20 83.2 kg (183 lb 6.8 oz)   03/10/20 83.9 kg (185 lb)   03/05/20 83.9 kg (185 lb)   02/20/20 81.6 kg (179 lb 14.3 oz)       Diabetes Management Status    Statin: Taking  ACE/ARB: Taking    Screening or Prevention Patient's value Goal Complete/Controlled?   HgA1C Testing and Control   Lab Results   Component Value Date    HGBA1C 8.7 (H) 09/14/2020      Annually/Less than 8% No   Lipid profile : 02/11/2020 Annually Yes   LDL control Lab Results   Component Value Date    LDLCALC 39.8 (L) 02/11/2020    Annually/Less than 100 mg/dl  Yes   Nephropathy screening Lab Results   Component Value Date    LABMICR 808.0 02/11/2020     Lab Results   Component Value Date    PROTEINUA 2+ (A) 03/13/2020    Annually Yes   Blood pressure BP Readings from Last 1 Encounters:   09/21/20 130/76    Less than 140/90 No   Dilated retinal exam : 08/10/2020 Annually Yes   Foot exam   : 11/21/2019 Annually Yes           Lab Results   Component Value Date    HGBA1C 8.7 (H) 09/14/2020    HGBA1C 8.6 (H) 06/08/2020    HGBA1C 8.1 (H) 02/11/2020         Reviewed past medical, family, social history and updated as appropriate.    Review of Systems   Constitutional: Negative for unexpected weight change.   Eyes: Negative for visual disturbance.   Respiratory: Negative for shortness of breath.    Cardiovascular: Negative for chest pain.   Gastrointestinal: Negative for abdominal pain.   Genitourinary: Negative for urgency.   Musculoskeletal: Positive for arthralgias.   Skin: Negative for wound.   Neurological: Negative for headaches.   Hematological: Does not bruise/bleed easily.    Psychiatric/Behavioral: Negative for sleep disturbance.   See HPI    Objective:     Vitals:    09/21/20 1033   BP: 130/76   Pulse: 60   Resp: 18   Temp: 98 °F (36.7 °C)     BP Readings from Last 5 Encounters:   09/21/20 130/76   07/15/20 (!) 161/91   06/22/20 134/83   06/18/20 124/74   06/16/20 130/78       Physical Exam  Vitals signs and nursing note reviewed.   Constitutional:       Appearance: He is well-developed.   HENT:      Right Ear: External ear normal.      Left Ear: External ear normal.      Nose: Nose normal.   Eyes:      General:         Right eye: No discharge.         Left eye: No discharge.      Conjunctiva/sclera: Conjunctivae normal.   Neck:      Thyroid: No thyromegaly.      Trachea: No tracheal deviation.   Cardiovascular:      Rate and Rhythm: Normal rate.   Pulmonary:      Effort: Pulmonary effort is normal. No respiratory distress.   Musculoskeletal:         General: Swelling (+2 edema to the bilateral mid shin) present.      Comments: No digital clubbing or extremity cyanosis   Skin:     Findings: No rash (no rash over area of pain).      Comments: No subcutaneous nodules noted.  Light palpation of the skin in the LLQ of the abdomen causes pain.   Neurological:      Mental Status: He is alert and oriented to person, place, and time.      Coordination: Coordination normal.   Psychiatric:         Behavior: Behavior normal.      Comments: Alert and oriented to person, place, and situation.         Wt Readings from Last 10 Encounters:   09/21/20 1033 87.1 kg (192 lb 0.3 oz)   06/30/20 1511 81.6 kg (180 lb)   06/18/20 1032 84.8 kg (186 lb 13.4 oz)   06/16/20 1121 84 kg (185 lb 3 oz)   06/04/20 1329 80.3 kg (177 lb)   04/23/20 1136 80.5 kg (177 lb 7.5 oz)   03/12/20 1323 83.2 kg (183 lb 6.8 oz)   03/10/20 0917 83.9 kg (185 lb)   03/05/20 1312 83.9 kg (185 lb)   02/20/20 1054 81.6 kg (179 lb 14.3 oz)       Lab Results   Component Value Date    HGBA1C 8.7 (H) 09/14/2020     Lab Results   Component  Value Date    CHOL 104 (L) 02/11/2020    HDL 31 (L) 02/11/2020    LDLCALC 39.8 (L) 02/11/2020    TRIG 166 (H) 02/11/2020    CHOLHDL 29.8 02/11/2020     Lab Results   Component Value Date     09/14/2020    K 4.6 09/14/2020     09/14/2020    CO2 25 09/14/2020     (H) 09/14/2020    BUN 38 (H) 09/14/2020    CREATININE 1.9 (H) 09/14/2020    CALCIUM 9.5 09/14/2020    PROT 7.8 09/14/2020    ALBUMIN 3.6 09/14/2020    BILITOT 0.4 09/14/2020    ALKPHOS 74 09/14/2020    AST 20 09/14/2020    ALT 24 09/14/2020    ANIONGAP 8 09/14/2020    ESTGFRAFRICA 40 (A) 09/14/2020    EGFRNONAA 34 (A) 09/14/2020      Lab Results   Component Value Date    MICALBCREAT 346.8 (H) 02/11/2020       Assessment/Plan:     Uncontrolled type 2 diabetes mellitus with hyperglycemia  Reviewed goals of therapy are to get the best control we can without hypoglycemia    Options for medical management her very limited due to kidney function and financial issues.  I discussed options including determining the price for a DPP4 inhibitor or switching to basal bolus insulin.  I am afraid either of these options may be cost prohibitive.     I will send in a prescription for Januvia 50 mg daily.  I told him to let me know if it was too expensive.  If so, our next choice will be multiple daily injections basal/bolus or premixed 70/30 insulin.    Medication changes:   Start:   4. Januvia 50 mg daily (renally dosed)  Continue:    3. Glimepiride 4 mg daily  4. Metformin 500 mg BID (renally dosed)  Stop:  1. Actos 15 mg daily - significant leg edema even on low-dose     Advised frequent self blood glucose monitoring. Patient encouraged to document glucose results and bring them to every clinic visit. Logs provided. Advised to start checking BG during the day alternating between different meals and bedtime.  Sent a prescription for Freestyle [place] 14 day sensors.    Hypoglycemia precautions discussed. Getting him off glimepiride eventually would be  preferable, but we have limited alternate options due to finances.      Close adherence to lifestyle changes recommended.      Eyes: UTD on eye exam.  Feet: UTD  Kidneys: UTD  HbA1c: Ordered for 3 months  Lipids: On statin  ASA: Yes    Lab Results   Component Value Date    HGBA1C 8.7 (H) 09/14/2020    HGBA1C 8.6 (H) 06/08/2020    HGBA1C 8.1 (H) 02/11/2020         Type 2 diabetes mellitus with stage 3 chronic kidney disease, without long-term current use of insulin  See above.    Coronary artery disease involving native coronary artery of native heart without angina pectoris  GLP-1 agonists and SGLT-2 inhibitors are not affordable, unfortunately.    Class 1 obesity due to excess calories in adult  Discussed dietary modification.          RTC in 3 months with labs prior

## 2020-09-25 ENCOUNTER — PATIENT MESSAGE (OUTPATIENT)
Dept: OTHER | Facility: OTHER | Age: 75
End: 2020-09-25

## 2020-09-28 ENCOUNTER — PATIENT MESSAGE (OUTPATIENT)
Dept: RESEARCH | Facility: OTHER | Age: 75
End: 2020-09-28

## 2020-10-09 ENCOUNTER — HOSPITAL ENCOUNTER (OUTPATIENT)
Dept: RADIOLOGY | Facility: HOSPITAL | Age: 75
Discharge: HOME | End: 2020-10-09
Attending: FAMILY MEDICINE
Payer: COMMERCIAL

## 2020-10-09 DIAGNOSIS — R91.1 LUNG NODULE: ICD-10-CM

## 2020-10-09 PROCEDURE — 71250 CT THORAX DX C-: CPT | Mod: TC

## 2020-10-09 PROCEDURE — 71250 CT THORAX DX C-: CPT | Mod: 26,,, | Performed by: RADIOLOGY

## 2020-10-09 PROCEDURE — 71250 CT CHEST WITHOUT CONTRAST: ICD-10-PCS | Mod: 26,,, | Performed by: RADIOLOGY

## 2020-10-13 NOTE — PROGRESS NOTES
Subjective:       Patient ID: Joann Adrian is a 74 y.o. male.    Chief Complaint: Follow-up and Flank Pain (Left)    LOW is a 74 y.o. male who presents today for follow up on his DM, HTN, DLD, and CAD     DM: still elevated. Referred to endocrinology. Cost issues with jardiance and trulicity and januvia. Taking amaryl 8 mg and metformin 500 BID. Sugars are Okay in the am, 112, 120.   DLD: Started atorvastatin 80 mg for cholesterol and CAD. 80 mg possibly caused GI issues. On 40 mg Now. LDL <70  HTN: on bisoprolol 5 mg, amlodipine 10 mg, valsartan 160 mg for HTN. BP controlled.   CAD: started statin. Couldn't tolerate 80 mg. On 40 mg now due to side effects.   CKD: has seen renal.   Microscopic hematuria: saw urology. Workup normal. No renal masses noted. cystoscopy normal. likely closer to 5 years if still +microscopic hematuria can repeat workup.      Abdominal pain: chronic. Has seen pain management. Injections initially helped. Pain returned. Pain radiates to the left flank. Last 5 min, 3-4 times a day. Worse with walking. CT has been negative x 2. Mild lymph nodes, unchanged on repeat imaging. Has seen GI    Didn't tolerate gabapentin.     No chest pain. No SOB.     Obesity: has gained weight.     Has some constipation.     He is also having some eye redness. No visual disturbance or pain.     Review of Systems   Constitutional: Negative for chills and fever.   Eyes: Positive for redness. Negative for visual disturbance.   Respiratory: Negative for cough and shortness of breath.    Gastrointestinal: Positive for abdominal pain. Negative for anal bleeding, blood in stool, constipation, diarrhea, nausea and vomiting.   Genitourinary: Negative for difficulty urinating and dysuria.   Neurological: Negative for dizziness, light-headedness and headaches.                 Results for orders placed or performed in visit on 09/14/20   Hemoglobin A1C   Result Value Ref Range    Hemoglobin A1C 8.7 (H) 4.0 - 5.6 %     Estimated Avg Glucose 203 (H) 68 - 131 mg/dL   Comprehensive metabolic panel   Result Value Ref Range    Sodium 136 136 - 145 mmol/L    Potassium 4.6 3.5 - 5.1 mmol/L    Chloride 103 95 - 110 mmol/L    CO2 25 23 - 29 mmol/L    Glucose 208 (H) 70 - 110 mg/dL    BUN, Bld 38 (H) 8 - 23 mg/dL    Creatinine 1.9 (H) 0.5 - 1.4 mg/dL    Calcium 9.5 8.7 - 10.5 mg/dL    Total Protein 7.8 6.0 - 8.4 g/dL    Albumin 3.6 3.5 - 5.2 g/dL    Total Bilirubin 0.4 0.1 - 1.0 mg/dL    Alkaline Phosphatase 74 55 - 135 U/L    AST 20 10 - 40 U/L    ALT 24 10 - 44 U/L    Anion Gap 8 8 - 16 mmol/L    eGFR if African American 40 (A) >60 mL/min/1.73 m^2    eGFR if non African American 34 (A) >60 mL/min/1.73 m^2       Objective:     Vitals:    10/14/20 0943   BP: 110/64   Pulse: 70   Temp: 97.7 °F (36.5 °C)        Physical Exam  Vitals signs and nursing note reviewed.   Constitutional:       Appearance: He is well-developed.   HENT:      Head: Normocephalic and atraumatic.   Eyes:      Comments: What appears to be a stye noted on the left upper eyelid   Neck:      Musculoskeletal: Normal range of motion and neck supple.   Cardiovascular:      Rate and Rhythm: Normal rate and regular rhythm.      Comments: Midline sternal scar noted  Pulmonary:      Effort: Pulmonary effort is normal.      Breath sounds: Normal breath sounds.   Abdominal:      Palpations: Abdomen is soft. Abdomen is not rigid.      Tenderness: There is abdominal tenderness in the left upper quadrant. There is no guarding or rebound. Negative signs include Raymond's sign.   Feet:      Left foot:      Skin integrity: No callus.   Skin:     General: Skin is warm.      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   Psychiatric:         Behavior: Behavior normal.         Thought Content: Thought content normal.         Judgment: Judgment normal.         Assessment:       1. Uncontrolled  type 2 diabetes mellitus with hyperglycemia    2. Type 2 diabetes mellitus with hyperglycemia, without long-term current use of insulin    3. Type 2 diabetes mellitus with stage 3a chronic kidney disease, without long-term current use of insulin    4. BMI 31.0-31.9,adult    5. Left upper quadrant pain    6. Thoracic radiculopathy    7. Need for influenza vaccination    8. Hordeolum externum of left upper eyelid        Plan:       Continue current medications for now.   Likely needs insulin  Take BS BID, message to endocrine in 2 weeks  Unable to afford trulicity, discussed with [facility]     Continue valsartan 160 mg, bisoprolol 5 mg, amldoipine 10 mg    Continue statin at reduce 40 mg dose    Try lyrica. Didn't tolerate gabapentin.     Kidney function is a little low. No NSAIDS such as ibuprofen or naproxen. Avoid Red meats. Drink a lot of water. I will continue monitoring kidney function q3-6 months.    F/u with pain management, message sent to Dr. Davis    F/u 4 months. Labs TBD at that time.     25 minutes spent with patient, of which >50% was spent on counseling and coordination of care.     Uncontrolled type 2 diabetes mellitus with hyperglycemia  -     glimepiride (AMARYL) 4 MG tablet; Take 2 tablets (8 mg total) by mouth daily with breakfast.  Dispense: 90 tablet; Refill: 0    Type 2 diabetes mellitus with hyperglycemia, without long-term current use of insulin  -     glimepiride (AMARYL) 4 MG tablet; Take 2 tablets (8 mg total) by mouth daily with breakfast.  Dispense: 90 tablet; Refill: 0    Type 2 diabetes mellitus with stage 3a chronic kidney disease, without long-term current use of insulin  -     glimepiride (AMARYL) 4 MG tablet; Take 2 tablets (8 mg total) by mouth daily with breakfast.  Dispense: 90 tablet; Refill: 0    BMI 31.0-31.9,adult    Left upper quadrant pain    Thoracic radiculopathy  -     Discontinue: pregabalin (LYRICA) 50 MG capsule; Take 1 capsule (50 mg total) by mouth 2  (two) times daily.  Dispense: 60 capsule; Refill: 6  -     pregabalin (LYRICA) 50 MG capsule; Take 1 capsule (50 mg total) by mouth 2 (two) times daily.  Dispense: 60 capsule; Refill: 4    Need for influenza vaccination  -     Influenza (FLUAD) - Quadrivalent (Adjuvanted) *Preferred* (65+) (PF)    Hordeolum externum of left upper eyelid  -     erythromycin (ROMYCIN) ophthalmic ointment; Place into the left eye every evening.  Dispense: 3.5 g; Refill: 0        Warning signs discussed, patient to call with any further issues or worsening of symptoms.

## 2020-10-14 ENCOUNTER — OFFICE VISIT (OUTPATIENT)
Dept: FAMILY MEDICINE | Facility: CLINIC | Age: 75
End: 2020-10-14
Payer: COMMERCIAL

## 2020-10-14 ENCOUNTER — TELEPHONE (OUTPATIENT)
Dept: FAMILY MEDICINE | Facility: CLINIC | Age: 75
End: 2020-10-14

## 2020-10-14 VITALS
OXYGEN SATURATION: 99 % | TEMPERATURE: 98 F | BODY MASS INDEX: 31.67 KG/M2 | SYSTOLIC BLOOD PRESSURE: 110 MMHG | DIASTOLIC BLOOD PRESSURE: 64 MMHG | WEIGHT: 196.19 LBS | HEART RATE: 70 BPM

## 2020-10-14 DIAGNOSIS — M54.14 THORACIC RADICULOPATHY: ICD-10-CM

## 2020-10-14 DIAGNOSIS — E11.65 TYPE 2 DIABETES MELLITUS WITH HYPERGLYCEMIA, WITHOUT LONG-TERM CURRENT USE OF INSULIN: ICD-10-CM

## 2020-10-14 DIAGNOSIS — N18.31 TYPE 2 DIABETES MELLITUS WITH STAGE 3A CHRONIC KIDNEY DISEASE, WITHOUT LONG-TERM CURRENT USE OF INSULIN: ICD-10-CM

## 2020-10-14 DIAGNOSIS — Z23 NEED FOR INFLUENZA VACCINATION: ICD-10-CM

## 2020-10-14 DIAGNOSIS — E11.65 UNCONTROLLED TYPE 2 DIABETES MELLITUS WITH HYPERGLYCEMIA: Primary | Chronic | ICD-10-CM

## 2020-10-14 DIAGNOSIS — H00.014 HORDEOLUM EXTERNUM OF LEFT UPPER EYELID: ICD-10-CM

## 2020-10-14 DIAGNOSIS — R10.12 LEFT UPPER QUADRANT PAIN: ICD-10-CM

## 2020-10-14 DIAGNOSIS — E11.22 TYPE 2 DIABETES MELLITUS WITH STAGE 3A CHRONIC KIDNEY DISEASE, WITHOUT LONG-TERM CURRENT USE OF INSULIN: ICD-10-CM

## 2020-10-14 PROCEDURE — 3078F PR MOST RECENT DIASTOLIC BLOOD PRESSURE < 80 MM HG: ICD-10-PCS | Mod: CPTII,S$GLB,, | Performed by: FAMILY MEDICINE

## 2020-10-14 PROCEDURE — 1125F PR PAIN SEVERITY QUANTIFIED, PAIN PRESENT: ICD-10-PCS | Mod: S$GLB,,, | Performed by: FAMILY MEDICINE

## 2020-10-14 PROCEDURE — 90694 FLU VACCINE - QUADRIVALENT - ADJUVANTED: ICD-10-PCS | Mod: S$GLB,,, | Performed by: FAMILY MEDICINE

## 2020-10-14 PROCEDURE — 3074F PR MOST RECENT SYSTOLIC BLOOD PRESSURE < 130 MM HG: ICD-10-PCS | Mod: CPTII,S$GLB,, | Performed by: FAMILY MEDICINE

## 2020-10-14 PROCEDURE — 1101F PR PT FALLS ASSESS DOC 0-1 FALLS W/OUT INJ PAST YR: ICD-10-PCS | Mod: CPTII,S$GLB,, | Performed by: FAMILY MEDICINE

## 2020-10-14 PROCEDURE — 3052F PR MOST RECENT HEMOGLOBIN A1C LEVEL 8.0 - < 9.0%: ICD-10-PCS | Mod: CPTII,S$GLB,, | Performed by: FAMILY MEDICINE

## 2020-10-14 PROCEDURE — 3078F DIAST BP <80 MM HG: CPT | Mod: CPTII,S$GLB,, | Performed by: FAMILY MEDICINE

## 2020-10-14 PROCEDURE — 99999 PR PBB SHADOW E&M-EST. PATIENT-LVL III: CPT | Mod: PBBFAC,,, | Performed by: FAMILY MEDICINE

## 2020-10-14 PROCEDURE — 99999 PR PBB SHADOW E&M-EST. PATIENT-LVL III: ICD-10-PCS | Mod: PBBFAC,,, | Performed by: FAMILY MEDICINE

## 2020-10-14 PROCEDURE — 1101F PT FALLS ASSESS-DOCD LE1/YR: CPT | Mod: CPTII,S$GLB,, | Performed by: FAMILY MEDICINE

## 2020-10-14 PROCEDURE — 3052F HG A1C>EQUAL 8.0%<EQUAL 9.0%: CPT | Mod: CPTII,S$GLB,, | Performed by: FAMILY MEDICINE

## 2020-10-14 PROCEDURE — 90471 IMMUNIZATION ADMIN: CPT | Mod: S$GLB,,, | Performed by: FAMILY MEDICINE

## 2020-10-14 PROCEDURE — 3008F PR BODY MASS INDEX (BMI) DOCUMENTED: ICD-10-PCS | Mod: CPTII,S$GLB,, | Performed by: FAMILY MEDICINE

## 2020-10-14 PROCEDURE — 1159F PR MEDICATION LIST DOCUMENTED IN MEDICAL RECORD: ICD-10-PCS | Mod: S$GLB,,, | Performed by: FAMILY MEDICINE

## 2020-10-14 PROCEDURE — 99214 PR OFFICE/OUTPT VISIT, EST, LEVL IV, 30-39 MIN: ICD-10-PCS | Mod: 25,S$GLB,, | Performed by: FAMILY MEDICINE

## 2020-10-14 PROCEDURE — 1125F AMNT PAIN NOTED PAIN PRSNT: CPT | Mod: S$GLB,,, | Performed by: FAMILY MEDICINE

## 2020-10-14 PROCEDURE — 90694 VACC AIIV4 NO PRSRV 0.5ML IM: CPT | Mod: S$GLB,,, | Performed by: FAMILY MEDICINE

## 2020-10-14 PROCEDURE — 1159F MED LIST DOCD IN RCRD: CPT | Mod: S$GLB,,, | Performed by: FAMILY MEDICINE

## 2020-10-14 PROCEDURE — 3074F SYST BP LT 130 MM HG: CPT | Mod: CPTII,S$GLB,, | Performed by: FAMILY MEDICINE

## 2020-10-14 PROCEDURE — 90471 FLU VACCINE - QUADRIVALENT - ADJUVANTED: ICD-10-PCS | Mod: S$GLB,,, | Performed by: FAMILY MEDICINE

## 2020-10-14 PROCEDURE — 3008F BODY MASS INDEX DOCD: CPT | Mod: CPTII,S$GLB,, | Performed by: FAMILY MEDICINE

## 2020-10-14 PROCEDURE — 99214 OFFICE O/P EST MOD 30 MIN: CPT | Mod: 25,S$GLB,, | Performed by: FAMILY MEDICINE

## 2020-10-14 RX ORDER — GLIMEPIRIDE 4 MG/1
8 TABLET ORAL
Qty: 90 TABLET | Refills: 0
Start: 2020-10-14 | End: 2020-11-23 | Stop reason: SDUPTHER

## 2020-10-14 RX ORDER — PREGABALIN 50 MG/1
50 CAPSULE ORAL 2 TIMES DAILY
Qty: 60 CAPSULE | Refills: 4 | Status: SHIPPED | OUTPATIENT
Start: 2020-10-14 | End: 2021-02-17

## 2020-10-14 RX ORDER — PREGABALIN 50 MG/1
50 CAPSULE ORAL 2 TIMES DAILY
Qty: 60 CAPSULE | Refills: 6 | Status: SHIPPED | OUTPATIENT
Start: 2020-10-14 | End: 2020-10-14 | Stop reason: SDUPTHER

## 2020-10-14 RX ORDER — ERYTHROMYCIN 5 MG/G
OINTMENT OPHTHALMIC NIGHTLY
Qty: 3.5 G | Refills: 0 | Status: SHIPPED | OUTPATIENT
Start: 2020-10-14

## 2020-10-14 NOTE — TELEPHONE ENCOUNTER
I returned phone call to pharmacy. [facility] requesting a new Rx for lyrica with 5 refills. Please advise.

## 2020-10-14 NOTE — Clinical Note
Hey, injections helps, but didn't last for a long time? They also caused his sugars to spike. Is there any other alternative? Can you set up a f/u with you?    JM

## 2020-10-20 ENCOUNTER — OFFICE VISIT (OUTPATIENT)
Dept: PAIN MEDICINE | Facility: CLINIC | Age: 75
End: 2020-10-20
Payer: COMMERCIAL

## 2020-10-20 VITALS
BODY MASS INDEX: 31.67 KG/M2 | HEART RATE: 76 BPM | SYSTOLIC BLOOD PRESSURE: 168 MMHG | WEIGHT: 196.19 LBS | DIASTOLIC BLOOD PRESSURE: 95 MMHG

## 2020-10-20 DIAGNOSIS — M54.14 THORACIC RADICULITIS: Primary | ICD-10-CM

## 2020-10-20 DIAGNOSIS — R10.12 LEFT UPPER QUADRANT PAIN: ICD-10-CM

## 2020-10-20 DIAGNOSIS — M79.2 NEUROPATHIC PAIN: ICD-10-CM

## 2020-10-20 DIAGNOSIS — B02.29 POSTHERPETIC NEURALGIA: ICD-10-CM

## 2020-10-20 PROCEDURE — 1159F MED LIST DOCD IN RCRD: CPT | Mod: S$GLB,,, | Performed by: PHYSICAL MEDICINE & REHABILITATION

## 2020-10-20 PROCEDURE — 3008F BODY MASS INDEX DOCD: CPT | Mod: CPTII,S$GLB,, | Performed by: PHYSICAL MEDICINE & REHABILITATION

## 2020-10-20 PROCEDURE — 99999 PR PBB SHADOW E&M-EST. PATIENT-LVL IV: CPT | Mod: PBBFAC,,, | Performed by: PHYSICAL MEDICINE & REHABILITATION

## 2020-10-20 PROCEDURE — 1125F PR PAIN SEVERITY QUANTIFIED, PAIN PRESENT: ICD-10-PCS | Mod: S$GLB,,, | Performed by: PHYSICAL MEDICINE & REHABILITATION

## 2020-10-20 PROCEDURE — 1101F PR PT FALLS ASSESS DOC 0-1 FALLS W/OUT INJ PAST YR: ICD-10-PCS | Mod: CPTII,S$GLB,, | Performed by: PHYSICAL MEDICINE & REHABILITATION

## 2020-10-20 PROCEDURE — 1159F PR MEDICATION LIST DOCUMENTED IN MEDICAL RECORD: ICD-10-PCS | Mod: S$GLB,,, | Performed by: PHYSICAL MEDICINE & REHABILITATION

## 2020-10-20 PROCEDURE — 99214 OFFICE O/P EST MOD 30 MIN: CPT | Mod: S$GLB,,, | Performed by: PHYSICAL MEDICINE & REHABILITATION

## 2020-10-20 PROCEDURE — 3077F PR MOST RECENT SYSTOLIC BLOOD PRESSURE >= 140 MM HG: ICD-10-PCS | Mod: CPTII,S$GLB,, | Performed by: PHYSICAL MEDICINE & REHABILITATION

## 2020-10-20 PROCEDURE — 3080F DIAST BP >= 90 MM HG: CPT | Mod: CPTII,S$GLB,, | Performed by: PHYSICAL MEDICINE & REHABILITATION

## 2020-10-20 PROCEDURE — 1125F AMNT PAIN NOTED PAIN PRSNT: CPT | Mod: S$GLB,,, | Performed by: PHYSICAL MEDICINE & REHABILITATION

## 2020-10-20 PROCEDURE — 1101F PT FALLS ASSESS-DOCD LE1/YR: CPT | Mod: CPTII,S$GLB,, | Performed by: PHYSICAL MEDICINE & REHABILITATION

## 2020-10-20 PROCEDURE — 3077F SYST BP >= 140 MM HG: CPT | Mod: CPTII,S$GLB,, | Performed by: PHYSICAL MEDICINE & REHABILITATION

## 2020-10-20 PROCEDURE — 99214 PR OFFICE/OUTPT VISIT, EST, LEVL IV, 30-39 MIN: ICD-10-PCS | Mod: S$GLB,,, | Performed by: PHYSICAL MEDICINE & REHABILITATION

## 2020-10-20 PROCEDURE — 3080F PR MOST RECENT DIASTOLIC BLOOD PRESSURE >= 90 MM HG: ICD-10-PCS | Mod: CPTII,S$GLB,, | Performed by: PHYSICAL MEDICINE & REHABILITATION

## 2020-10-20 PROCEDURE — 99999 PR PBB SHADOW E&M-EST. PATIENT-LVL IV: ICD-10-PCS | Mod: PBBFAC,,, | Performed by: PHYSICAL MEDICINE & REHABILITATION

## 2020-10-20 PROCEDURE — 3008F PR BODY MASS INDEX (BMI) DOCUMENTED: ICD-10-PCS | Mod: CPTII,S$GLB,, | Performed by: PHYSICAL MEDICINE & REHABILITATION

## 2020-10-20 NOTE — PROGRESS NOTES
Pain Medicine      Chief Complaint:   Chief Complaint   Patient presents with    Abdominal Pain       History of Present Illness: Joann Adrian is a 74 y.o. male referred by No ref. provider found for LUQ abdominal pain.      He also presents with right sided face/head pain, and right hip/leg pain. Head/facial pain is his worst pain.     Onset: Shingles on right side of head 2 years ago, with persisting pain in that area. Two months of LUQ abdominal pain. Right hip and right SIJ pain laterally over GTB started 3 months ago. No clear inciting incident  Location: Right side of face above eye (V1 distribution). LUQ abdominal. Right lateral hip and SIJ.   Radiation: Right side of face radiates to back of head. LUQ abdominal pain radiates into lower quadrant on the left. Right hip pain radiates down the right leg.   Timing: constant  Quality: Face is burning. LUQ is burning, Aching and Dull. Right hip/leg pain feels aching  Exacerbating Factors: Touching the face. nothing in particular aggravates the abdomen or right hip/leg pain.   Alleviating Factors: medications  Associated Symptoms: denies night fever/night sweats, urinary incontinence, bowel incontinence, significant weight loss, significant motor weakness and loss of sensations    Severity: Currently: 6 /10   Typical Range: 1-8/10     Exacerbation: 7/10     He previously saw Dr. Eagle for pain.     Interval History (10/20/2020):  Joann Adrian returns today for follow up.     Prior T7-8 THORACIC EPIDURAL STEROID INJECTION provided 100% relief for 2 months of his abdominal pain.     Currently, the abdominal pain is worse.      No change in the location or quality of the pain since the most recent visit is reported.  No significant interval events or traumas. No change in bowel or bladder function, & no new weakness or numbness is reported. No new musculoskeletal pain complaints.    Current Pain Scales:  Current: 10/10              Typical Range: 5-10/10  Pain  Disability Index  Family/Home Responsibilities:: 8  Recreation:: 8  Social Activity:: 7  Occupation:: 9  Sexual Behavior:: 8  Self Care:: 3  Life-Support Activities:: 0  Pain Disability Index (PDI): 43    Previous Interventions:  - None    Previous Therapies:  PT/OT: yes, doesn't feel like it is helping  Surgery: no surgery of spine or abdomen  Previous Medications:   - NSAIDS: Tylenol. Advil helps some  - Muscle Relaxants:    - TCAs:   - SNRIs:   - Topicals:   - Anticonvulsants:  Lyrica didn't help.   - Opioids: Took something from Dr. Bolivar which caused sedation    Current Pain Medications:  1. Tylenol  2. Gabapentin 300 mg every evening     Blood Thinners: ASA 81 mg     Full Medication List:    Current Outpatient Medications:     amLODIPine (NORVASC) 10 MG tablet, Take 1 tablet (10 mg total) by mouth once daily., Disp: 90 tablet, Rfl: 0    aspirin 81 MG Chew, Take 1 tablet (81 mg total) by mouth once daily., Disp: , Rfl:     atorvastatin (LIPITOR) 40 MG tablet, Take 1 tablet (40 mg total) by mouth once daily., Disp: 90 tablet, Rfl: 0    bisoprolol (ZEBETA) 5 MG tablet, Take 1 tablet (5 mg total) by mouth once daily., Disp: 90 tablet, Rfl: 0    erythromycin (ROMYCIN) ophthalmic ointment, Place into the left eye every evening., Disp: 3.5 g, Rfl: 0    glimepiride (AMARYL) 4 MG tablet, Take 2 tablets (8 mg total) by mouth daily with breakfast., Disp: 90 tablet, Rfl: 0    metFORMIN (GLUCOPHAGE) 500 MG tablet, Take 1 tablet (500 mg total) by mouth 2 (two) times daily with meals., Disp: 180 tablet, Rfl: 4    multivitamin (ONE DAILY MULTIVITAMIN) per tablet, Take 1 tablet by mouth once daily., Disp: , Rfl:     pregabalin (LYRICA) 50 MG capsule, Take 1 capsule (50 mg total) by mouth 2 (two) times daily., Disp: 60 capsule, Rfl: 4    valsartan (DIOVAN) 160 MG tablet, Take 1 tablet (160 mg total) by mouth once daily., Disp: 90 tablet, Rfl: 0     Review of Systems:  Review of Systems   Constitutional: Negative for  fever and weight loss.   HENT: Negative for ear pain and tinnitus.    Eyes: Positive for pain and redness.   Respiratory: Negative for cough and shortness of breath.    Cardiovascular: Negative for chest pain and palpitations.   Gastrointestinal: Positive for abdominal pain and constipation. Negative for heartburn.   Genitourinary: Positive for frequency.        Denies urinary incontinence. Denies urine retention.    Musculoskeletal: Positive for back pain. Negative for neck pain.   Skin: Negative for itching and rash.   Neurological: Positive for dizziness, weakness and headaches. Negative for tingling and seizures.   Endo/Heme/Allergies: Negative for environmental allergies. Does not bruise/bleed easily.   Psychiatric/Behavioral: Negative for depression. The patient has insomnia. The patient is not nervous/anxious.        Allergies:  Patient has no known allergies.     Medical History:   has a past medical history of Cataract, Coronary artery disease, Diabetes mellitus, type 2, Hyperlipidemia, and Hypertension.    Surgical History:   has a past surgical history that includes Coronary artery bypass graft; Cataract extraction, bilateral; Cataract extraction; and Epidural steroid injection into thoracic spine (N/A, 7/15/2020).    Family History:  family history includes Liver disease in his sister.    Social History:   reports that he has never smoked. He has never used smokeless tobacco. He reports previous alcohol use. He reports previous drug use.    Physical Exam:  BP (!) 168/95   Pulse 76   Wt 89 kg (196 lb 3.4 oz)   BMI 31.67 kg/m²   GEN: No acute distress. Calm, comfortable  HENT: Normocephalic, atraumatic, moist mucous membranes  EYE: Anicteric sclera, non-injected  CV: Non-diaphoretic.  CHEST: Breathing comfortably. Chest expansion symmetric  EXT: No clubbing, cyanosis.   Psych: Mood and affect are appropriate  GAIT: Independent, normal ambulation  Lumbar/Thoracic Spine Exam:       Inspection: No  erythema, bruising. No surgical incisions      Palpation: (+) TTP of lumbar and SIJ on the right      ROM:  Limited in flexion, extension, lateral bending.       (+) Facet loading bilaterally      (-) Straight Leg Raise bilaterally      (+) WALLACE on the right  Hip Exam:      Inspection: No gross deformity or apparent leg length discrepancy      Palpation: (+) TTP to right greater trochanteric bursa, ITB      ROM:  No significant limitation or pain in internal rotation, external rotation  Neurologic Exam:     Alert. Speech is fluent and appropriate.     Strength:  5/5 throughout bilateral lower extremities     Sensation:  Grossly intact to light touch in bilateral lower extremities     Reflexes: Absent in b/l biceps, patella, achilles. (+) 1 in b/l BR.    Tone: No abnormality appreciated in bilateral lower extremities     No Clonus     Downgoing toes on plantar stimulation     (-) Yeager bilaterally       Imaging:  - MRI Lumbar Spine 3/18/20:  Grade 1 anterolisthesis L5 on S1 secondary to facet joint arthropathy.  There is scattered mild endplate degenerative change with degenerative disc disease most pronounced at L5/S1 with mild height loss.  The distal spinal cord and conus is normal in signal and contour tip of the conus approximates the inferior L1 level.  No aneurysmal dilatation of the visualized abdominal aorta.  Scattered partially visualized prominent foci periaortic and right retrocaval retroperitoneum suggestive for prominent retroperitoneal lymph nodes, largest measuring 11 mm in short access.  T12/L1 through L1/L2: No significant disc bulge, central canal or neural foraminal stenosis.  L2/L3: Bulging disc without significant central canal stenosis.  There is a joint arthropathy and mild bilateral neural foraminal stenosis.  L3/L4:No significant disc bulge or central canal stenosis.  There is facet joint arthropathy and mild bilateral foraminal stenosis.  L4/L5:Bulging disc with ligamentum flavum  hypertrophy and facet joint arthropathy and superimposed epidural lipomatosis dorsally with moderate resulting central canal stenosis and bilateral neural foraminal stenosis.  L5/S1: Grade 1 anterolisthesis the with posterior disc osteophyte and ligamentum flavum hypertrophy and facet joint arthropathy without significant central canal stenosis with moderate to severe bilateral neural foraminal stenosis    - X-ray Lumbar Spine 3/10/20:  Vertebral bodies are intact.  L5-S1 disc space is mildly narrowed.  Mild degenerative changes are seen around the facet joints.  No collapse or destruction is noted.  No instability in flexion and extension    - CT Abd Pelv w/ contrast 1/9/20:  1. No acute abnormalities in the abdomen or pelvis.  2. Right lower lobe pulmonary nodule measuring 0.9 cm.  Per Fleischner Society 2017 guidelines, recommend follow-up CT chest in 3 months.  3. Many borderline enlarged retroperitoneal nodes and a mildly enlarged portacaval lymph node.  These are nonspecific.  Consider short-term follow-up in 3 months.  4. Punctate calcifications probably within a gastrohepatic lymph node and adjacent to the esophagus in the lower mediastinum suggests prior granulomatous disease.  5. Cholelithiasis.    Labs:  BMP  Lab Results   Component Value Date     09/14/2020    K 4.6 09/14/2020     09/14/2020    CO2 25 09/14/2020    BUN 38 (H) 09/14/2020    CREATININE 1.9 (H) 09/14/2020    CALCIUM 9.5 09/14/2020    ANIONGAP 8 09/14/2020    ESTGFRAFRICA 40 (A) 09/14/2020    EGFRNONAA 34 (A) 09/14/2020     Lab Results   Component Value Date    ALT 24 09/14/2020    AST 20 09/14/2020    ALKPHOS 74 09/14/2020    BILITOT 0.4 09/14/2020     Lab Results   Component Value Date     06/08/2020     Lab Results   Component Value Date    LABA1C 9.0 (A) 11/12/2019    HGBA1C 8.7 (H) 09/14/2020     Assessment:  Joann Adrian is a 74 y.o. male with the following diagnoses based on history, exam, and imaging:    Problem  List Items Addressed This Visit        Neuro    Neuropathic pain       GI    Left upper quadrant pain      Other Visit Diagnoses     Thoracic radiculitis    -  Primary    Postherpetic neuralgia              This is a pleasant 74 y.o. gentleman with CAD, hypertension, CKD, DM2, GERD,     - Right Head/Facial Pain: Appears to be due to postherpetic neuralgia with history of shingles in the area.  - Abdominal Pain: He has a ventral hernia on exam. He has TTP in LUQ and not around the hernia, which may be myofascial, as it does not appear visceral in nature. Abd US did not assess midline, so may repeat in future. ?PHN left flank vs thoracic radiculopathy. IL KELSIE provided excellent relief x 2 months.   - Back pain appears multifactorial. 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     Treatment Plan:   - PT/OT/HEP: Patient just finished PT. Encouraged daily light aerobic exercise  - Procedures: Schedule for T8/9 IL KELSIE. The procedure risks, benefits, and possible complications were discussed with the patient including nerve damage, infection, bleeding, spinal headache, and paresis.    - Will need to monitor blood sugars after KELSIE.   - Consider abdominal TPI   - Consider right L4 & L5 TF KELSIE in the future  - Medications:   - Not able to tolerate higher dose of gabapentin so back at 300 mg qHS.   - Recommend salon pas patches for PHN facial pain and can try for abdominal pain.   - Imaging: Reviewed. May repeat ABD US to assess ventral hernia in the future  - Labs: Reviewed.  Current GFR = 44 mL/min based off of Cockcroft-Gault equation. Medications are appropriately dosed for current hepatorenal function.    Follow Up: RTC in Late May/June    Sera Davis M.D.  Interventional Pain Medicine / Physical Medicine & Rehabilitation    Disclaimer: This note was partly generated using dictation software which may occasionally result in transcription errors.

## 2020-10-21 ENCOUNTER — TELEPHONE (OUTPATIENT)
Dept: PAIN MEDICINE | Facility: CLINIC | Age: 75
End: 2020-10-21

## 2020-10-21 DIAGNOSIS — M54.14 THORACIC RADICULOPATHY: Primary | ICD-10-CM

## 2020-10-21 NOTE — TELEPHONE ENCOUNTER
Pt scheduled for 10/28/20 at 1115 am for Thoracic KELSIE. Pt aware to check in at registration desk on the first floor of the hospital for 1015 am. Pt denied taking blood thinners and is not diabetic. Pt denied having a pacemaker/ICD.

## 2020-10-27 ENCOUNTER — PATIENT MESSAGE (OUTPATIENT)
Dept: PAIN MEDICINE | Facility: HOSPITAL | Age: 75
End: 2020-10-27

## 2020-10-28 ENCOUNTER — TELEPHONE (OUTPATIENT)
Dept: PAIN MEDICINE | Facility: HOSPITAL | Age: 75
End: 2020-10-28

## 2020-11-09 ENCOUNTER — PATIENT OUTREACH (OUTPATIENT)
Dept: ADMINISTRATIVE | Facility: OTHER | Age: 75
End: 2020-11-09

## 2020-11-09 NOTE — PROGRESS NOTES
Health Maintenance Due   Topic Date Due    TETANUS VACCINE  11/22/1963    Shingles Vaccine (1 of 2) 11/22/1996     Updates were requested from care everywhere.  Chart was reviewed for overdue Proactive [facility] Encounters (JACINTA) topics (CRS, Breast Cancer Screening, Eye exam)  Health Maintenance has been updated.  LINKS immunization registry triggered.  Immunizations were reconciled.

## 2020-11-24 DIAGNOSIS — M54.14 THORACIC RADICULITIS: ICD-10-CM

## 2020-11-24 DIAGNOSIS — M54.14 THORACIC RADICULOPATHY: Primary | ICD-10-CM

## 2020-12-14 ENCOUNTER — LAB VISIT (OUTPATIENT)
Dept: LAB | Facility: HOSPITAL | Age: 75
End: 2020-12-14
Attending: INTERNAL MEDICINE
Payer: COMMERCIAL

## 2020-12-14 DIAGNOSIS — E11.65 TYPE 2 DIABETES MELLITUS WITH HYPERGLYCEMIA, WITHOUT LONG-TERM CURRENT USE OF INSULIN: ICD-10-CM

## 2020-12-14 LAB
ALBUMIN SERPL BCP-MCNC: 3.8 G/DL (ref 3.5–5.2)
ALP SERPL-CCNC: 82 U/L (ref 55–135)
ALT SERPL W/O P-5'-P-CCNC: 31 U/L (ref 10–44)
ANION GAP SERPL CALC-SCNC: 8 MMOL/L (ref 8–16)
AST SERPL-CCNC: 23 U/L (ref 10–40)
BILIRUB SERPL-MCNC: 0.4 MG/DL (ref 0.1–1)
BUN SERPL-MCNC: 25 MG/DL (ref 8–23)
CALCIUM SERPL-MCNC: 9.2 MG/DL (ref 8.7–10.5)
CHLORIDE SERPL-SCNC: 105 MMOL/L (ref 95–110)
CO2 SERPL-SCNC: 24 MMOL/L (ref 23–29)
CREAT SERPL-MCNC: 1.7 MG/DL (ref 0.5–1.4)
EST. GFR  (AFRICAN AMERICAN): 45 ML/MIN/1.73 M^2
EST. GFR  (NON AFRICAN AMERICAN): 39 ML/MIN/1.73 M^2
ESTIMATED AVG GLUCOSE: 186 MG/DL (ref 68–131)
GLUCOSE SERPL-MCNC: 171 MG/DL (ref 70–110)
HBA1C MFR BLD HPLC: 8.1 % (ref 4–5.6)
POTASSIUM SERPL-SCNC: 4.7 MMOL/L (ref 3.5–5.1)
PROT SERPL-MCNC: 8.1 G/DL (ref 6–8.4)
SODIUM SERPL-SCNC: 137 MMOL/L (ref 136–145)

## 2020-12-14 PROCEDURE — 83036 HEMOGLOBIN GLYCOSYLATED A1C: CPT

## 2020-12-14 PROCEDURE — 36415 COLL VENOUS BLD VENIPUNCTURE: CPT

## 2020-12-14 PROCEDURE — 80053 COMPREHEN METABOLIC PANEL: CPT

## 2020-12-18 ENCOUNTER — PATIENT OUTREACH (OUTPATIENT)
Dept: ADMINISTRATIVE | Facility: OTHER | Age: 75
End: 2020-12-18

## 2020-12-18 DIAGNOSIS — Z12.11 ENCOUNTER FOR FIT (FECAL IMMUNOCHEMICAL TEST) SCREENING: Primary | ICD-10-CM

## 2020-12-18 NOTE — PROGRESS NOTES
Care Everywhere:   Immunization: updated  Health Maintenance: updated  Media Review: review for outside colon cancer report   Legacy Review:   Order placed: fit kit   Upcoming appts:

## 2021-01-05 ENCOUNTER — PATIENT MESSAGE (OUTPATIENT)
Dept: ADMINISTRATIVE | Facility: HOSPITAL | Age: 76
End: 2021-01-05

## 2021-01-13 ENCOUNTER — PATIENT MESSAGE (OUTPATIENT)
Dept: FAMILY MEDICINE | Facility: CLINIC | Age: 76
End: 2021-01-13

## 2021-02-03 ENCOUNTER — DOCUMENTATION ONLY (OUTPATIENT)
Dept: ADMINISTRATIVE | Facility: HOSPITAL | Age: 76
End: 2021-02-03

## 2021-02-03 ENCOUNTER — PATIENT OUTREACH (OUTPATIENT)
Dept: ADMINISTRATIVE | Facility: HOSPITAL | Age: 76
End: 2021-02-03

## 2021-02-17 ENCOUNTER — OFFICE VISIT (OUTPATIENT)
Dept: FAMILY MEDICINE | Facility: CLINIC | Age: 76
End: 2021-02-17
Payer: COMMERCIAL

## 2021-02-17 VITALS
HEIGHT: 66 IN | DIASTOLIC BLOOD PRESSURE: 70 MMHG | TEMPERATURE: 97 F | OXYGEN SATURATION: 98 % | SYSTOLIC BLOOD PRESSURE: 130 MMHG | BODY MASS INDEX: 31.36 KG/M2 | HEART RATE: 71 BPM | WEIGHT: 195.13 LBS

## 2021-02-17 DIAGNOSIS — E11.65 UNCONTROLLED TYPE 2 DIABETES MELLITUS WITH HYPERGLYCEMIA: ICD-10-CM

## 2021-02-17 DIAGNOSIS — E78.49 OTHER HYPERLIPIDEMIA: ICD-10-CM

## 2021-02-17 DIAGNOSIS — G89.29 OTHER CHRONIC PAIN: ICD-10-CM

## 2021-02-17 DIAGNOSIS — E11.65 TYPE 2 DIABETES MELLITUS WITH HYPERGLYCEMIA, WITHOUT LONG-TERM CURRENT USE OF INSULIN: ICD-10-CM

## 2021-02-17 DIAGNOSIS — G44.209 TENSION HEADACHE: ICD-10-CM

## 2021-02-17 DIAGNOSIS — I10 ESSENTIAL HYPERTENSION: ICD-10-CM

## 2021-02-17 DIAGNOSIS — N18.31 TYPE 2 DIABETES MELLITUS WITH STAGE 3A CHRONIC KIDNEY DISEASE, WITHOUT LONG-TERM CURRENT USE OF INSULIN: Primary | ICD-10-CM

## 2021-02-17 DIAGNOSIS — E11.42 DIABETIC POLYNEUROPATHY ASSOCIATED WITH TYPE 2 DIABETES MELLITUS: ICD-10-CM

## 2021-02-17 DIAGNOSIS — M54.14 THORACIC RADICULOPATHY: ICD-10-CM

## 2021-02-17 DIAGNOSIS — I25.10 CORONARY ARTERY DISEASE INVOLVING NATIVE CORONARY ARTERY OF NATIVE HEART WITHOUT ANGINA PECTORIS: ICD-10-CM

## 2021-02-17 DIAGNOSIS — E53.8 B12 DEFICIENCY: ICD-10-CM

## 2021-02-17 DIAGNOSIS — N18.31 STAGE 3A CHRONIC KIDNEY DISEASE: ICD-10-CM

## 2021-02-17 DIAGNOSIS — E11.22 TYPE 2 DIABETES MELLITUS WITH STAGE 3A CHRONIC KIDNEY DISEASE, WITHOUT LONG-TERM CURRENT USE OF INSULIN: Primary | ICD-10-CM

## 2021-02-17 DIAGNOSIS — Z12.11 COLON CANCER SCREENING: ICD-10-CM

## 2021-02-17 PROCEDURE — 3078F PR MOST RECENT DIASTOLIC BLOOD PRESSURE < 80 MM HG: ICD-10-PCS | Mod: CPTII,S$GLB,, | Performed by: FAMILY MEDICINE

## 2021-02-17 PROCEDURE — 3008F BODY MASS INDEX DOCD: CPT | Mod: CPTII,S$GLB,, | Performed by: FAMILY MEDICINE

## 2021-02-17 PROCEDURE — 3075F SYST BP GE 130 - 139MM HG: CPT | Mod: CPTII,S$GLB,, | Performed by: FAMILY MEDICINE

## 2021-02-17 PROCEDURE — 1126F PR PAIN SEVERITY QUANTIFIED, NO PAIN PRESENT: ICD-10-PCS | Mod: S$GLB,,, | Performed by: FAMILY MEDICINE

## 2021-02-17 PROCEDURE — 3075F PR MOST RECENT SYSTOLIC BLOOD PRESS GE 130-139MM HG: ICD-10-PCS | Mod: CPTII,S$GLB,, | Performed by: FAMILY MEDICINE

## 2021-02-17 PROCEDURE — 99999 PR PBB SHADOW E&M-EST. PATIENT-LVL IV: CPT | Mod: PBBFAC,,, | Performed by: FAMILY MEDICINE

## 2021-02-17 PROCEDURE — 3052F PR MOST RECENT HEMOGLOBIN A1C LEVEL 8.0 - < 9.0%: ICD-10-PCS | Mod: CPTII,S$GLB,, | Performed by: FAMILY MEDICINE

## 2021-02-17 PROCEDURE — 1101F PT FALLS ASSESS-DOCD LE1/YR: CPT | Mod: CPTII,S$GLB,, | Performed by: FAMILY MEDICINE

## 2021-02-17 PROCEDURE — 3078F DIAST BP <80 MM HG: CPT | Mod: CPTII,S$GLB,, | Performed by: FAMILY MEDICINE

## 2021-02-17 PROCEDURE — 99214 PR OFFICE/OUTPT VISIT, EST, LEVL IV, 30-39 MIN: ICD-10-PCS | Mod: S$GLB,,, | Performed by: FAMILY MEDICINE

## 2021-02-17 PROCEDURE — 99214 OFFICE O/P EST MOD 30 MIN: CPT | Mod: S$GLB,,, | Performed by: FAMILY MEDICINE

## 2021-02-17 PROCEDURE — 1159F PR MEDICATION LIST DOCUMENTED IN MEDICAL RECORD: ICD-10-PCS | Mod: S$GLB,,, | Performed by: FAMILY MEDICINE

## 2021-02-17 PROCEDURE — 1126F AMNT PAIN NOTED NONE PRSNT: CPT | Mod: S$GLB,,, | Performed by: FAMILY MEDICINE

## 2021-02-17 PROCEDURE — 1101F PR PT FALLS ASSESS DOC 0-1 FALLS W/OUT INJ PAST YR: ICD-10-PCS | Mod: CPTII,S$GLB,, | Performed by: FAMILY MEDICINE

## 2021-02-17 PROCEDURE — 1159F MED LIST DOCD IN RCRD: CPT | Mod: S$GLB,,, | Performed by: FAMILY MEDICINE

## 2021-02-17 PROCEDURE — 3008F PR BODY MASS INDEX (BMI) DOCUMENTED: ICD-10-PCS | Mod: CPTII,S$GLB,, | Performed by: FAMILY MEDICINE

## 2021-02-17 PROCEDURE — 99999 PR PBB SHADOW E&M-EST. PATIENT-LVL IV: ICD-10-PCS | Mod: PBBFAC,,, | Performed by: FAMILY MEDICINE

## 2021-02-17 PROCEDURE — 3052F HG A1C>EQUAL 8.0%<EQUAL 9.0%: CPT | Mod: CPTII,S$GLB,, | Performed by: FAMILY MEDICINE

## 2021-02-17 PROCEDURE — 3288F PR FALLS RISK ASSESSMENT DOCUMENTED: ICD-10-PCS | Mod: CPTII,S$GLB,, | Performed by: FAMILY MEDICINE

## 2021-02-17 PROCEDURE — 3288F FALL RISK ASSESSMENT DOCD: CPT | Mod: CPTII,S$GLB,, | Performed by: FAMILY MEDICINE

## 2021-02-17 RX ORDER — METFORMIN HYDROCHLORIDE 500 MG/1
500 TABLET ORAL 2 TIMES DAILY WITH MEALS
Qty: 180 TABLET | Refills: 4 | Status: SHIPPED | OUTPATIENT
Start: 2021-02-17 | End: 2021-05-20 | Stop reason: SDUPTHER

## 2021-02-17 RX ORDER — AMLODIPINE BESYLATE 10 MG/1
10 TABLET ORAL DAILY
Qty: 90 TABLET | Refills: 0 | Status: SHIPPED | OUTPATIENT
Start: 2021-02-17 | End: 2021-05-20 | Stop reason: SDUPTHER

## 2021-02-17 RX ORDER — BISOPROLOL FUMARATE 5 MG/1
5 TABLET, FILM COATED ORAL DAILY
Qty: 90 TABLET | Refills: 0 | Status: SHIPPED | OUTPATIENT
Start: 2021-02-17 | End: 2021-05-20 | Stop reason: SDUPTHER

## 2021-02-17 RX ORDER — ATORVASTATIN CALCIUM 40 MG/1
40 TABLET, FILM COATED ORAL DAILY
Qty: 90 TABLET | Refills: 0 | Status: SHIPPED | OUTPATIENT
Start: 2021-02-17 | End: 2021-05-20 | Stop reason: SDUPTHER

## 2021-02-17 RX ORDER — VALSARTAN 160 MG/1
160 TABLET ORAL DAILY
Qty: 90 TABLET | Refills: 0 | Status: SHIPPED | OUTPATIENT
Start: 2021-02-17 | End: 2021-05-20 | Stop reason: SDUPTHER

## 2021-02-17 RX ORDER — GLIMEPIRIDE 4 MG/1
8 TABLET ORAL
Qty: 180 TABLET | Refills: 0 | Status: SHIPPED | OUTPATIENT
Start: 2021-02-17 | End: 2021-03-29 | Stop reason: SDUPTHER

## 2021-02-17 RX ORDER — NAPROXEN SODIUM 220 MG/1
81 TABLET, FILM COATED ORAL DAILY
Qty: 90 TABLET | Refills: 5
Start: 2021-02-17 | End: 2021-05-20 | Stop reason: SDUPTHER

## 2021-02-19 ENCOUNTER — DOCUMENTATION ONLY (OUTPATIENT)
Dept: ADMINISTRATIVE | Facility: HOSPITAL | Age: 76
End: 2021-02-19

## 2021-02-19 ENCOUNTER — TELEPHONE (OUTPATIENT)
Dept: ADMINISTRATIVE | Facility: HOSPITAL | Age: 76
End: 2021-02-19

## 2021-02-27 ENCOUNTER — IMMUNIZATION (OUTPATIENT)
Dept: INTERNAL MEDICINE | Facility: CLINIC | Age: 76
End: 2021-02-27
Payer: COMMERCIAL

## 2021-02-27 DIAGNOSIS — Z23 NEED FOR VACCINATION: Primary | ICD-10-CM

## 2021-02-27 PROCEDURE — 91300 COVID-19, MRNA, LNP-S, PF, 30 MCG/0.3 ML DOSE VACCINE: CPT | Mod: PBBFAC | Performed by: FAMILY MEDICINE

## 2021-03-20 ENCOUNTER — IMMUNIZATION (OUTPATIENT)
Dept: INTERNAL MEDICINE | Facility: CLINIC | Age: 76
End: 2021-03-20
Payer: COMMERCIAL

## 2021-03-20 DIAGNOSIS — Z23 NEED FOR VACCINATION: Primary | ICD-10-CM

## 2021-03-20 PROCEDURE — 91300 COVID-19, MRNA, LNP-S, PF, 30 MCG/0.3 ML DOSE VACCINE: CPT | Mod: PBBFAC | Performed by: FAMILY MEDICINE

## 2021-03-20 PROCEDURE — 0002A COVID-19, MRNA, LNP-S, PF, 30 MCG/0.3 ML DOSE VACCINE: CPT | Mod: PBBFAC | Performed by: FAMILY MEDICINE

## 2021-03-29 DIAGNOSIS — E11.22 TYPE 2 DIABETES MELLITUS WITH STAGE 3A CHRONIC KIDNEY DISEASE, WITHOUT LONG-TERM CURRENT USE OF INSULIN: ICD-10-CM

## 2021-03-29 DIAGNOSIS — N18.31 TYPE 2 DIABETES MELLITUS WITH STAGE 3A CHRONIC KIDNEY DISEASE, WITHOUT LONG-TERM CURRENT USE OF INSULIN: ICD-10-CM

## 2021-03-29 DIAGNOSIS — E11.65 TYPE 2 DIABETES MELLITUS WITH HYPERGLYCEMIA, WITHOUT LONG-TERM CURRENT USE OF INSULIN: ICD-10-CM

## 2021-03-29 DIAGNOSIS — E11.65 UNCONTROLLED TYPE 2 DIABETES MELLITUS WITH HYPERGLYCEMIA: ICD-10-CM

## 2021-03-29 RX ORDER — GLIMEPIRIDE 4 MG/1
8 TABLET ORAL
Qty: 180 TABLET | Refills: 0 | Status: SHIPPED | OUTPATIENT
Start: 2021-03-29 | End: 2021-04-24 | Stop reason: SDUPTHER

## 2021-04-05 ENCOUNTER — PATIENT MESSAGE (OUTPATIENT)
Dept: ADMINISTRATIVE | Facility: HOSPITAL | Age: 76
End: 2021-04-05

## 2021-04-05 ENCOUNTER — LAB VISIT (OUTPATIENT)
Dept: LAB | Facility: HOSPITAL | Age: 76
End: 2021-04-05
Attending: FAMILY MEDICINE
Payer: COMMERCIAL

## 2021-04-05 DIAGNOSIS — N18.31 STAGE 3A CHRONIC KIDNEY DISEASE: ICD-10-CM

## 2021-04-05 DIAGNOSIS — I25.10 CORONARY ARTERY DISEASE INVOLVING NATIVE CORONARY ARTERY OF NATIVE HEART WITHOUT ANGINA PECTORIS: ICD-10-CM

## 2021-04-05 DIAGNOSIS — E53.8 B12 DEFICIENCY: ICD-10-CM

## 2021-04-05 DIAGNOSIS — E78.49 OTHER HYPERLIPIDEMIA: ICD-10-CM

## 2021-04-05 DIAGNOSIS — N18.31 TYPE 2 DIABETES MELLITUS WITH STAGE 3A CHRONIC KIDNEY DISEASE, WITHOUT LONG-TERM CURRENT USE OF INSULIN: ICD-10-CM

## 2021-04-05 DIAGNOSIS — E11.22 TYPE 2 DIABETES MELLITUS WITH STAGE 3A CHRONIC KIDNEY DISEASE, WITHOUT LONG-TERM CURRENT USE OF INSULIN: ICD-10-CM

## 2021-04-05 DIAGNOSIS — I10 ESSENTIAL HYPERTENSION: ICD-10-CM

## 2021-04-05 DIAGNOSIS — E11.65 UNCONTROLLED TYPE 2 DIABETES MELLITUS WITH HYPERGLYCEMIA: ICD-10-CM

## 2021-04-05 LAB
25(OH)D3+25(OH)D2 SERPL-MCNC: 28 NG/ML (ref 30–96)
ALBUMIN SERPL BCP-MCNC: 3.6 G/DL (ref 3.5–5.2)
ALP SERPL-CCNC: 87 U/L (ref 55–135)
ALT SERPL W/O P-5'-P-CCNC: 23 U/L (ref 10–44)
ANION GAP SERPL CALC-SCNC: 11 MMOL/L (ref 8–16)
AST SERPL-CCNC: 19 U/L (ref 10–40)
BASOPHILS # BLD AUTO: 0.04 K/UL (ref 0–0.2)
BASOPHILS NFR BLD: 0.6 % (ref 0–1.9)
BILIRUB SERPL-MCNC: 0.4 MG/DL (ref 0.1–1)
BUN SERPL-MCNC: 34 MG/DL (ref 8–23)
CALCIUM SERPL-MCNC: 9.5 MG/DL (ref 8.7–10.5)
CHLORIDE SERPL-SCNC: 103 MMOL/L (ref 95–110)
CHOLEST SERPL-MCNC: 122 MG/DL (ref 120–199)
CHOLEST/HDLC SERPL: 3.8 {RATIO} (ref 2–5)
CO2 SERPL-SCNC: 23 MMOL/L (ref 23–29)
CREAT SERPL-MCNC: 2.1 MG/DL (ref 0.5–1.4)
DIFFERENTIAL METHOD: NORMAL
EOSINOPHIL # BLD AUTO: 0.3 K/UL (ref 0–0.5)
EOSINOPHIL NFR BLD: 4 % (ref 0–8)
ERYTHROCYTE [DISTWIDTH] IN BLOOD BY AUTOMATED COUNT: 13.9 % (ref 11.5–14.5)
EST. GFR  (AFRICAN AMERICAN): 34.8 ML/MIN/1.73 M^2
EST. GFR  (NON AFRICAN AMERICAN): 30.1 ML/MIN/1.73 M^2
ESTIMATED AVG GLUCOSE: 177 MG/DL (ref 68–131)
GLUCOSE SERPL-MCNC: 203 MG/DL (ref 70–110)
HBA1C MFR BLD: 7.8 % (ref 4–5.6)
HCT VFR BLD AUTO: 45.8 % (ref 40–54)
HDLC SERPL-MCNC: 32 MG/DL (ref 40–75)
HDLC SERPL: 26.2 % (ref 20–50)
HGB BLD-MCNC: 15.1 G/DL (ref 14–18)
IMM GRANULOCYTES # BLD AUTO: 0.02 K/UL (ref 0–0.04)
IMM GRANULOCYTES NFR BLD AUTO: 0.3 % (ref 0–0.5)
LDLC SERPL CALC-MCNC: 45.4 MG/DL (ref 63–159)
LYMPHOCYTES # BLD AUTO: 1.6 K/UL (ref 1–4.8)
LYMPHOCYTES NFR BLD: 23.9 % (ref 18–48)
MCH RBC QN AUTO: 27.1 PG (ref 27–31)
MCHC RBC AUTO-ENTMCNC: 33 G/DL (ref 32–36)
MCV RBC AUTO: 82 FL (ref 82–98)
MONOCYTES # BLD AUTO: 0.9 K/UL (ref 0.3–1)
MONOCYTES NFR BLD: 12.4 % (ref 4–15)
NEUTROPHILS # BLD AUTO: 4 K/UL (ref 1.8–7.7)
NEUTROPHILS NFR BLD: 58.8 % (ref 38–73)
NONHDLC SERPL-MCNC: 90 MG/DL
NRBC BLD-RTO: 0 /100 WBC
PLATELET # BLD AUTO: 170 K/UL (ref 150–450)
PMV BLD AUTO: 11.1 FL (ref 9.2–12.9)
POTASSIUM SERPL-SCNC: 4.8 MMOL/L (ref 3.5–5.1)
PROT SERPL-MCNC: 8.1 G/DL (ref 6–8.4)
RBC # BLD AUTO: 5.57 M/UL (ref 4.6–6.2)
SODIUM SERPL-SCNC: 137 MMOL/L (ref 136–145)
TRIGL SERPL-MCNC: 223 MG/DL (ref 30–150)
TSH SERPL DL<=0.005 MIU/L-ACNC: 1.51 UIU/ML (ref 0.4–4)
VIT B12 SERPL-MCNC: 1236 PG/ML (ref 210–950)
WBC # BLD AUTO: 6.83 K/UL (ref 3.9–12.7)

## 2021-04-05 PROCEDURE — 84443 ASSAY THYROID STIM HORMONE: CPT | Performed by: FAMILY MEDICINE

## 2021-04-05 PROCEDURE — 83036 HEMOGLOBIN GLYCOSYLATED A1C: CPT | Performed by: FAMILY MEDICINE

## 2021-04-05 PROCEDURE — 82607 VITAMIN B-12: CPT | Performed by: FAMILY MEDICINE

## 2021-04-05 PROCEDURE — 85025 COMPLETE CBC W/AUTO DIFF WBC: CPT | Performed by: FAMILY MEDICINE

## 2021-04-05 PROCEDURE — 80053 COMPREHEN METABOLIC PANEL: CPT | Performed by: FAMILY MEDICINE

## 2021-04-05 PROCEDURE — 82306 VITAMIN D 25 HYDROXY: CPT | Performed by: FAMILY MEDICINE

## 2021-04-05 PROCEDURE — 36415 COLL VENOUS BLD VENIPUNCTURE: CPT | Mod: PO | Performed by: FAMILY MEDICINE

## 2021-04-05 PROCEDURE — 80061 LIPID PANEL: CPT | Performed by: FAMILY MEDICINE

## 2021-04-07 ENCOUNTER — TELEPHONE (OUTPATIENT)
Dept: NEPHROLOGY | Facility: CLINIC | Age: 76
End: 2021-04-07

## 2021-04-21 ENCOUNTER — PATIENT OUTREACH (OUTPATIENT)
Dept: ADMINISTRATIVE | Facility: OTHER | Age: 76
End: 2021-04-21

## 2021-05-20 ENCOUNTER — PATIENT MESSAGE (OUTPATIENT)
Dept: FAMILY MEDICINE | Facility: CLINIC | Age: 76
End: 2021-05-20

## 2021-05-20 DIAGNOSIS — E78.49 OTHER HYPERLIPIDEMIA: ICD-10-CM

## 2021-05-20 DIAGNOSIS — E11.65 TYPE 2 DIABETES MELLITUS WITH HYPERGLYCEMIA, WITHOUT LONG-TERM CURRENT USE OF INSULIN: ICD-10-CM

## 2021-05-20 DIAGNOSIS — E11.65 UNCONTROLLED TYPE 2 DIABETES MELLITUS WITH HYPERGLYCEMIA: ICD-10-CM

## 2021-05-20 DIAGNOSIS — H00.014 HORDEOLUM EXTERNUM OF LEFT UPPER EYELID: ICD-10-CM

## 2021-05-20 DIAGNOSIS — I25.10 CORONARY ARTERY DISEASE INVOLVING NATIVE CORONARY ARTERY OF NATIVE HEART WITHOUT ANGINA PECTORIS: ICD-10-CM

## 2021-05-20 DIAGNOSIS — N18.31 TYPE 2 DIABETES MELLITUS WITH STAGE 3A CHRONIC KIDNEY DISEASE, WITHOUT LONG-TERM CURRENT USE OF INSULIN: ICD-10-CM

## 2021-05-20 DIAGNOSIS — I10 ESSENTIAL HYPERTENSION: ICD-10-CM

## 2021-05-20 DIAGNOSIS — E11.22 TYPE 2 DIABETES MELLITUS WITH STAGE 3A CHRONIC KIDNEY DISEASE, WITHOUT LONG-TERM CURRENT USE OF INSULIN: ICD-10-CM

## 2021-05-20 RX ORDER — GLIMEPIRIDE 4 MG/1
8 TABLET ORAL
Qty: 180 TABLET | Refills: 0 | Status: SHIPPED | OUTPATIENT
Start: 2021-05-20 | End: 2022-05-20

## 2021-05-20 RX ORDER — METFORMIN HYDROCHLORIDE 500 MG/1
500 TABLET ORAL 2 TIMES DAILY WITH MEALS
Qty: 180 TABLET | Refills: 4 | Status: SHIPPED | OUTPATIENT
Start: 2021-05-20

## 2021-05-20 RX ORDER — NAPROXEN SODIUM 220 MG/1
81 TABLET, FILM COATED ORAL DAILY
Qty: 90 TABLET | Refills: 5
Start: 2021-05-20

## 2021-05-20 RX ORDER — BISOPROLOL FUMARATE 5 MG/1
5 TABLET, FILM COATED ORAL DAILY
Qty: 90 TABLET | Refills: 0 | Status: SHIPPED | OUTPATIENT
Start: 2021-05-20 | End: 2022-05-20

## 2021-05-20 RX ORDER — AMLODIPINE BESYLATE 10 MG/1
10 TABLET ORAL DAILY
Qty: 90 TABLET | Refills: 0 | Status: SHIPPED | OUTPATIENT
Start: 2021-05-20

## 2021-05-20 RX ORDER — VALSARTAN 160 MG/1
160 TABLET ORAL DAILY
Qty: 90 TABLET | Refills: 0 | Status: SHIPPED | OUTPATIENT
Start: 2021-05-20 | End: 2022-05-20

## 2021-05-20 RX ORDER — ATORVASTATIN CALCIUM 40 MG/1
40 TABLET, FILM COATED ORAL DAILY
Qty: 90 TABLET | Refills: 0 | Status: SHIPPED | OUTPATIENT
Start: 2021-05-20 | End: 2021-08-18

## 2021-05-25 ENCOUNTER — CLINICAL SUPPORT (OUTPATIENT)
Dept: URGENT CARE | Facility: CLINIC | Age: 76
End: 2021-05-25
Payer: COMMERCIAL

## 2021-05-25 DIAGNOSIS — Z11.52 ENCOUNTER FOR SCREENING LABORATORY TESTING FOR COVID-19 VIRUS IN ASYMPTOMATIC PATIENT: Primary | ICD-10-CM

## 2021-05-25 DIAGNOSIS — Z01.812 ENCOUNTER FOR SCREENING LABORATORY TESTING FOR COVID-19 VIRUS IN ASYMPTOMATIC PATIENT: Primary | ICD-10-CM

## 2021-05-25 LAB — SARS-COV-2 RNA RESP QL NAA+PROBE: NOT DETECTED

## 2021-05-25 PROCEDURE — U0003 INFECTIOUS AGENT DETECTION BY NUCLEIC ACID (DNA OR RNA); SEVERE ACUTE RESPIRATORY SYNDROME CORONAVIRUS 2 (SARS-COV-2) (CORONAVIRUS DISEASE [COVID-19]), AMPLIFIED PROBE TECHNIQUE, MAKING USE OF HIGH THROUGHPUT TECHNOLOGIES AS DESCRIBED BY CMS-2020-01-R: HCPCS | Performed by: PHYSICIAN ASSISTANT

## 2021-05-25 PROCEDURE — U0005 INFEC AGEN DETEC AMPLI PROBE: HCPCS | Performed by: PHYSICIAN ASSISTANT

## 2021-05-26 DIAGNOSIS — E11.9 TYPE 2 DIABETES MELLITUS WITHOUT COMPLICATION: ICD-10-CM

## 2021-05-28 ENCOUNTER — CLINICAL SUPPORT (OUTPATIENT)
Dept: URGENT CARE | Facility: CLINIC | Age: 76
End: 2021-05-28
Payer: COMMERCIAL

## 2021-05-28 DIAGNOSIS — Z20.822 ENCOUNTER FOR LABORATORY TESTING FOR COVID-19 VIRUS: ICD-10-CM

## 2021-05-28 PROCEDURE — U0003 INFECTIOUS AGENT DETECTION BY NUCLEIC ACID (DNA OR RNA); SEVERE ACUTE RESPIRATORY SYNDROME CORONAVIRUS 2 (SARS-COV-2) (CORONAVIRUS DISEASE [COVID-19]), AMPLIFIED PROBE TECHNIQUE, MAKING USE OF HIGH THROUGHPUT TECHNOLOGIES AS DESCRIBED BY CMS-2020-01-R: HCPCS | Performed by: NURSE PRACTITIONER

## 2021-05-28 PROCEDURE — U0005 INFEC AGEN DETEC AMPLI PROBE: HCPCS | Performed by: NURSE PRACTITIONER

## 2021-05-29 LAB — SARS-COV-2 RNA RESP QL NAA+PROBE: NOT DETECTED

## 2021-06-07 ENCOUNTER — PATIENT OUTREACH (OUTPATIENT)
Dept: ADMINISTRATIVE | Facility: HOSPITAL | Age: 76
End: 2021-06-07

## 2021-07-06 ENCOUNTER — PATIENT MESSAGE (OUTPATIENT)
Dept: ADMINISTRATIVE | Facility: HOSPITAL | Age: 76
End: 2021-07-06

## 2021-09-13 ENCOUNTER — PATIENT OUTREACH (OUTPATIENT)
Dept: ADMINISTRATIVE | Facility: HOSPITAL | Age: 76
End: 2021-09-13

## 2021-10-04 ENCOUNTER — PATIENT MESSAGE (OUTPATIENT)
Dept: ADMINISTRATIVE | Facility: HOSPITAL | Age: 76
End: 2021-10-04

## 2021-10-07 ENCOUNTER — PATIENT OUTREACH (OUTPATIENT)
Dept: ADMINISTRATIVE | Facility: HOSPITAL | Age: 76
End: 2021-10-07

## 2022-03-22 ENCOUNTER — PATIENT OUTREACH (OUTPATIENT)
Dept: ADMINISTRATIVE | Facility: HOSPITAL | Age: 77
End: 2022-03-22
Payer: COMMERCIAL

## 2022-03-22 NOTE — PROGRESS NOTES
Chart audit performed LINKS triggered and updatedCare everywhere triggered & updatedPatient outreach regarding Health Maintenance- lvm

## 2022-05-17 ENCOUNTER — TELEPHONE (OUTPATIENT)
Dept: FAMILY MEDICINE | Facility: CLINIC | Age: 77
End: 2022-05-17
Payer: COMMERCIAL
